# Patient Record
Sex: FEMALE | Race: WHITE | Employment: FULL TIME | URBAN - NONMETROPOLITAN AREA
[De-identification: names, ages, dates, MRNs, and addresses within clinical notes are randomized per-mention and may not be internally consistent; named-entity substitution may affect disease eponyms.]

---

## 2018-11-08 ENCOUNTER — HOSPITAL ENCOUNTER (OUTPATIENT)
Dept: WOMENS IMAGING | Age: 48
Discharge: HOME | End: 2018-11-10
Payer: COMMERCIAL

## 2018-11-08 DIAGNOSIS — Z00.00 WELLNESS EXAMINATION: ICD-10-CM

## 2018-11-08 PROCEDURE — 77063 BREAST TOMOSYNTHESIS BI: CPT

## 2019-11-26 ENCOUNTER — HOSPITAL ENCOUNTER (OUTPATIENT)
Dept: WOMENS IMAGING | Age: 49
Discharge: HOME | End: 2019-11-28
Payer: COMMERCIAL

## 2019-11-26 DIAGNOSIS — Z12.31 BREAST CANCER SCREENING BY MAMMOGRAM: ICD-10-CM

## 2019-11-26 PROCEDURE — 77063 BREAST TOMOSYNTHESIS BI: CPT

## 2020-04-16 ENCOUNTER — HOSPITAL ENCOUNTER (OUTPATIENT)
Dept: GENERAL RADIOLOGY | Age: 50
Discharge: HOME | End: 2020-04-18
Payer: COMMERCIAL

## 2020-04-16 ENCOUNTER — HOSPITAL ENCOUNTER (OUTPATIENT)
Age: 50
Discharge: HOME | End: 2020-04-18
Payer: COMMERCIAL

## 2020-04-16 PROCEDURE — 71046 X-RAY EXAM CHEST 2 VIEWS: CPT

## 2020-10-29 ENCOUNTER — OFFICE VISIT (OUTPATIENT)
Dept: OBGYN | Age: 50
End: 2020-10-29
Payer: COMMERCIAL

## 2020-10-29 ENCOUNTER — HOSPITAL ENCOUNTER (OUTPATIENT)
Age: 50
Discharge: HOME | End: 2020-10-29
Payer: COMMERCIAL

## 2020-10-29 ENCOUNTER — HOSPITAL ENCOUNTER (OUTPATIENT)
Age: 50
Setting detail: SPECIMEN
Discharge: HOME | End: 2020-10-29
Payer: COMMERCIAL

## 2020-10-29 VITALS
DIASTOLIC BLOOD PRESSURE: 62 MMHG | HEIGHT: 62 IN | WEIGHT: 156 LBS | BODY MASS INDEX: 28.71 KG/M2 | SYSTOLIC BLOOD PRESSURE: 122 MMHG

## 2020-10-29 LAB
ESTRADIOL LEVEL: 432 PG/ML (ref 27–314)
FOLLICLE STIMULATING HORMONE: 3.2 U/L (ref 1.7–21.5)
LH: 12.4 U/L (ref 1–95.6)
TESTOSTERONE TOTAL: 46 NG/DL (ref 20–70)
TSH SERPL DL<=0.05 MIU/L-ACNC: 2.44 MIU/L (ref 0.3–5)

## 2020-10-29 PROCEDURE — 99396 PREV VISIT EST AGE 40-64: CPT | Performed by: ADVANCED PRACTICE MIDWIFE

## 2020-10-29 PROCEDURE — 82627 DEHYDROEPIANDROSTERONE: CPT

## 2020-10-29 PROCEDURE — 83001 ASSAY OF GONADOTROPIN (FSH): CPT

## 2020-10-29 PROCEDURE — 87624 HPV HI-RISK TYP POOLED RSLT: CPT

## 2020-10-29 PROCEDURE — 83002 ASSAY OF GONADOTROPIN (LH): CPT

## 2020-10-29 PROCEDURE — 84443 ASSAY THYROID STIM HORMONE: CPT

## 2020-10-29 PROCEDURE — 82670 ASSAY OF TOTAL ESTRADIOL: CPT

## 2020-10-29 PROCEDURE — 36415 COLL VENOUS BLD VENIPUNCTURE: CPT

## 2020-10-29 PROCEDURE — 84403 ASSAY OF TOTAL TESTOSTERONE: CPT

## 2020-10-29 PROCEDURE — G0145 SCR C/V CYTO,THINLAYER,RESCR: HCPCS

## 2020-10-29 ASSESSMENT — PATIENT HEALTH QUESTIONNAIRE - PHQ9
1. LITTLE INTEREST OR PLEASURE IN DOING THINGS: 1
SUM OF ALL RESPONSES TO PHQ QUESTIONS 1-9: 2
SUM OF ALL RESPONSES TO PHQ9 QUESTIONS 1 & 2: 2
SUM OF ALL RESPONSES TO PHQ QUESTIONS 1-9: 2
2. FEELING DOWN, DEPRESSED OR HOPELESS: 1
SUM OF ALL RESPONSES TO PHQ QUESTIONS 1-9: 2
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
## 2020-10-30 LAB — DHEAS (DHEA SULFATE): 342 UG/DL (ref 26–200)

## 2020-11-01 ASSESSMENT — ENCOUNTER SYMPTOMS
SHORTNESS OF BREATH: 0
ABDOMINAL PAIN: 0

## 2020-11-03 LAB
HPV SOURCE: NORMAL
HPV, GENOTYPE 16: NOT DETECTED
HPV, GENOTYPE 18: NOT DETECTED
HPV, HIGH RISK OTHER: NOT DETECTED

## 2020-11-04 ENCOUNTER — OFFICE VISIT (OUTPATIENT)
Dept: OBGYN | Age: 50
End: 2020-11-04
Payer: COMMERCIAL

## 2020-11-04 VITALS
BODY MASS INDEX: 28.63 KG/M2 | WEIGHT: 155.6 LBS | SYSTOLIC BLOOD PRESSURE: 112 MMHG | DIASTOLIC BLOOD PRESSURE: 80 MMHG | HEIGHT: 62 IN

## 2020-11-04 PROCEDURE — 99213 OFFICE O/P EST LOW 20 MIN: CPT | Performed by: ADVANCED PRACTICE MIDWIFE

## 2020-11-04 RX ORDER — DROSPIRENONE AND ETHINYL ESTRADIOL 0.02-3(28)
1 KIT ORAL DAILY
Qty: 28 TABLET | Refills: 3 | Status: SHIPPED | OUTPATIENT
Start: 2020-11-04 | End: 2022-10-21

## 2020-11-04 NOTE — PROGRESS NOTES
PROBLEM VISIT     Date of service: 2020    Owen Montes  Is a 48 y.o.  female    PT's PCP is: Betsy Trevino MD     : 1970                                             Subjective:       Patient's last menstrual period was 10/02/2020 (exact date). OB History    Para Term  AB Living   3 3 3     3   SAB TAB Ectopic Molar Multiple Live Births             3      # Outcome Date GA Lbr William/2nd Weight Sex Delivery Anes PTL Lv   3 Term 98 40w0d   M Vag-Spont   VANNESSA   2 Term 93 40w0d   M Vag-Spont   VANNESSA   1 Term 10/18/90 40w0d   M Vag-Spont   VANNESSA        Social History     Tobacco Use   Smoking Status Never Smoker   Smokeless Tobacco Never Used        Social History     Substance and Sexual Activity   Alcohol Use Yes    Comment: occasional       Social History     Substance and Sexual Activity   Sexual Activity Yes    Partners: Male    Comment: partner vasectomy       Allergies: Patient has no known allergies. Chief Complaint   Patient presents with    Menstrual Problem     Follow up in house ultrasound and labs. Concerns with emotions. Last Yearly:  10/2020    Last pap: 10/2020    Last HPV: 10/2020    PE:  Vital Signs  Blood pressure 112/80, height 5' 2\" (1.575 m), weight 155 lb 9.6 oz (70.6 kg), last menstrual period 10/02/2020, not currently breastfeeding. Estimated body mass index is 28.46 kg/m² as calculated from the following:    Height as of this encounter: 5' 2\" (1.575 m). Weight as of this encounter: 155 lb 9.6 oz (70.6 kg). NURSE: Jameson POLANCO    HPI: here for irregular menses and mood        PT denies fever, chills, nausea and vomiting       Objective: No acute distress  Excellent communications  Well-nourished    Results reviewed today:  Sono:  UTERUS: non-homogenous, adenomyotic appearing, anteverted uterus     ENDO: measures 7.8 mm, LMP 10-     RT. OVARY: 2.4 x 2.1 x 2.4 cm simple cyst     LT.  OVARY: two simple cysts, 2.4 x 1.9 x 2.1 cm & 2.1 x 1.8 x 2 cm in size     No free fluid  No results found for this visit on 11/04/20. Assessment and Plan          Diagnosis Orders   1. Hirsutism  Eflornithine HCl 13.9 % CREA   2. Irregular menses     3. PMDD (premenstrual dysphoric disorder)  drospirenone-ethinyl estradiol (FORTINO) 3-0.02 MG per tablet         discussed premphase, vs low dose ocp vs expectant management. Patient desired low dose ocp, discussed, use, risks, dangers, side effects. I am having Gino Shutters. Gage start on drospirenone-ethinyl estradiol and Eflornithine HCl. I am also having her maintain her loratadine, albuterol sulfate HFA, montelukast, levothyroxine, ondansetron, escitalopram, and ALPRAZolam.    Return in about 3 months (around 2/4/2021) for med check. There are no Patient Instructions on file for this visit. Over 50% of time spent on counseling and care coordination on: see assessment and plan,  She was also counseled on her preventative health maintenance recommendations and follow-up.         FF time: 15 min      Azalea Monroy,11/5/2020 8:43 PM

## 2020-11-05 LAB — CYTOLOGY REPORT: NORMAL

## 2020-11-05 RX ORDER — METRONIDAZOLE 7.5 MG/G
GEL VAGINAL
Qty: 1 TUBE | Refills: 0 | Status: SHIPPED | OUTPATIENT
Start: 2020-11-05 | End: 2021-07-26

## 2020-11-11 ENCOUNTER — HOSPITAL ENCOUNTER (OUTPATIENT)
Dept: LAB | Age: 50
Setting detail: SPECIMEN
Discharge: HOME | End: 2020-11-11
Payer: COMMERCIAL

## 2020-11-11 PROCEDURE — C9803 HOPD COVID-19 SPEC COLLECT: HCPCS

## 2020-11-11 PROCEDURE — U0003 INFECTIOUS AGENT DETECTION BY NUCLEIC ACID (DNA OR RNA); SEVERE ACUTE RESPIRATORY SYNDROME CORONAVIRUS 2 (SARS-COV-2) (CORONAVIRUS DISEASE [COVID-19]), AMPLIFIED PROBE TECHNIQUE, MAKING USE OF HIGH THROUGHPUT TECHNOLOGIES AS DESCRIBED BY CMS-2020-01-R: HCPCS

## 2020-11-14 LAB — SARS-COV-2, NAA: DETECTED

## 2020-11-15 ENCOUNTER — TELEPHONE (OUTPATIENT)
Dept: PRIMARY CARE CLINIC | Age: 50
End: 2020-11-15

## 2021-01-15 ENCOUNTER — HOSPITAL ENCOUNTER (OUTPATIENT)
Dept: WOMENS IMAGING | Age: 51
Discharge: HOME | End: 2021-01-17
Payer: COMMERCIAL

## 2021-01-15 DIAGNOSIS — Z12.31 SCREENING MAMMOGRAM, ENCOUNTER FOR: ICD-10-CM

## 2021-01-15 PROCEDURE — 77063 BREAST TOMOSYNTHESIS BI: CPT

## 2021-02-09 ENCOUNTER — OFFICE VISIT (OUTPATIENT)
Dept: OBGYN | Age: 51
End: 2021-02-09
Payer: COMMERCIAL

## 2021-02-09 VITALS
WEIGHT: 161.2 LBS | HEIGHT: 62 IN | DIASTOLIC BLOOD PRESSURE: 66 MMHG | BODY MASS INDEX: 29.66 KG/M2 | SYSTOLIC BLOOD PRESSURE: 118 MMHG

## 2021-02-09 DIAGNOSIS — N92.6 IRREGULAR MENSES: ICD-10-CM

## 2021-02-09 DIAGNOSIS — F32.81 PMDD (PREMENSTRUAL DYSPHORIC DISORDER): Primary | ICD-10-CM

## 2021-02-09 PROCEDURE — 99213 OFFICE O/P EST LOW 20 MIN: CPT | Performed by: ADVANCED PRACTICE MIDWIFE

## 2021-02-09 RX ORDER — DROSPIRENONE AND ETHINYL ESTRADIOL 0.02-3(28)
1 KIT ORAL DAILY
Qty: 28 TABLET | Refills: 8 | Status: SHIPPED | OUTPATIENT
Start: 2021-02-09 | End: 2021-02-15 | Stop reason: SDUPTHER

## 2021-02-09 NOTE — PROGRESS NOTES
PROBLEM VISIT     Date of service: 2021    Rebekah King  Is a 48 y.o.  female    PT's PCP is: Maya Herron MD     : 1970                                             Subjective:       Patient's last menstrual period was 2021 (approximate). OB History    Para Term  AB Living   3 3 3     3   SAB TAB Ectopic Molar Multiple Live Births             3      # Outcome Date GA Lbr William/2nd Weight Sex Delivery Anes PTL Lv   3 Term 98 40w0d   M Vag-Spont   VANNESSA   2 Term 93 40w0d   M Vag-Spont   VANNESSA   1 Term 10/18/90 40w0d   M Vag-Spont   VANNESSA        Social History     Tobacco Use   Smoking Status Never Smoker   Smokeless Tobacco Never Used        Social History     Substance and Sexual Activity   Alcohol Use Yes    Comment: occasional       Social History     Substance and Sexual Activity   Sexual Activity Yes    Partners: Male    Comment: partner vasectomy       Allergies: Patient has no known allergies. Chief Complaint   Patient presents with    Mood Swings     Medication follow up. patient is taking Herman and \"feels better\". Periods are regular. Last Yearly:  10/2020    Last pap: 10/2020    Last HPV: 10/2020    PE:  Vital Signs  Blood pressure 118/66, height 5' 2\" (1.575 m), weight 161 lb 3.2 oz (73.1 kg), last menstrual period 2021, not currently breastfeeding. Estimated body mass index is 29.48 kg/m² as calculated from the following:    Height as of this encounter: 5' 2\" (1.575 m). Weight as of this encounter: 161 lb 3.2 oz (73.1 kg). No data recorded      NURSE: Jameson POLANCO    HPI: here for med check herman, has regulated cycles well and mood is \"much better\", has been unable to lose weight and is discussing thyroid with Dr. Brian Betancourt next week      PT denies fever, chills, nausea and vomiting       Objective: No acute distress  Excellent communications  Well-nourished    Results reviewed today:    No results found for this visit on 21. Assessment and Plan          Diagnosis Orders   1. PMDD (premenstrual dysphoric disorder)  drospirenone-ethinyl estradiol (FORTINO) 3-0.02 MG per tablet   2. Irregular menses  drospirenone-ethinyl estradiol (FORTINO) 3-0.02 MG per tablet       will continue fortino through 51-52 and then consider HRT dependent on symptoms at that time      I am having Lakeisha Almonte start on drospirenone-ethinyl estradiol. I am also having her maintain her loratadine, albuterol sulfate HFA, montelukast, levothyroxine, ondansetron, drospirenone-ethinyl estradiol, Eflornithine HCl, metroNIDAZOLE, and escitalopram.    Return in about 8 months (around 10/9/2021). There are no Patient Instructions on file for this visit. Over 50% of time spent on counseling and care coordination on: see assessment and plan,  She was also counseled on her preventative health maintenance recommendations and follow-up.         FF time: 20 min      Marv Monroy,2/9/2021 8:47 AM

## 2021-06-28 ENCOUNTER — HOSPITAL ENCOUNTER (OUTPATIENT)
Age: 51
Discharge: HOME | End: 2021-06-30
Payer: COMMERCIAL

## 2021-06-28 ENCOUNTER — HOSPITAL ENCOUNTER (OUTPATIENT)
Dept: GENERAL RADIOLOGY | Age: 51
Discharge: HOME | End: 2021-06-30
Payer: COMMERCIAL

## 2021-06-28 DIAGNOSIS — K21.9 GASTROESOPHAGEAL REFLUX DISEASE WITHOUT ESOPHAGITIS: ICD-10-CM

## 2021-06-28 PROCEDURE — 71045 X-RAY EXAM CHEST 1 VIEW: CPT

## 2021-07-26 ENCOUNTER — HOSPITAL ENCOUNTER (OUTPATIENT)
Age: 51
Discharge: HOME | End: 2021-07-26
Payer: COMMERCIAL

## 2021-07-26 ENCOUNTER — OFFICE VISIT (OUTPATIENT)
Dept: PULMONOLOGY | Age: 51
End: 2021-07-26
Payer: COMMERCIAL

## 2021-07-26 VITALS
RESPIRATION RATE: 18 BRPM | SYSTOLIC BLOOD PRESSURE: 112 MMHG | HEART RATE: 98 BPM | OXYGEN SATURATION: 97 % | BODY MASS INDEX: 30.16 KG/M2 | WEIGHT: 163.9 LBS | HEIGHT: 62 IN | TEMPERATURE: 99.2 F | DIASTOLIC BLOOD PRESSURE: 64 MMHG

## 2021-07-26 DIAGNOSIS — J45.40 MODERATE PERSISTENT ASTHMA WITHOUT COMPLICATION: ICD-10-CM

## 2021-07-26 DIAGNOSIS — J45.40 MODERATE PERSISTENT ASTHMA WITHOUT COMPLICATION: Primary | ICD-10-CM

## 2021-07-26 PROCEDURE — 82785 ASSAY OF IGE: CPT

## 2021-07-26 PROCEDURE — 86003 ALLG SPEC IGE CRUDE XTRC EA: CPT

## 2021-07-26 PROCEDURE — 36415 COLL VENOUS BLD VENIPUNCTURE: CPT

## 2021-07-26 PROCEDURE — 99204 OFFICE O/P NEW MOD 45 MIN: CPT | Performed by: INTERNAL MEDICINE

## 2021-07-26 RX ORDER — FEXOFENADINE HCL 180 MG/1
180 TABLET ORAL DAILY
COMMUNITY

## 2021-07-26 RX ORDER — MONTELUKAST SODIUM 10 MG/1
10 TABLET ORAL DAILY
Qty: 30 TABLET | Refills: 3 | Status: SHIPPED | OUTPATIENT
Start: 2021-07-26 | End: 2021-12-21 | Stop reason: SDUPTHER

## 2021-07-26 RX ORDER — FLUTICASONE PROPIONATE AND SALMETEROL 113; 14 UG/1; UG/1
1 POWDER, METERED RESPIRATORY (INHALATION) 2 TIMES DAILY
Qty: 60 EACH | Refills: 2 | Status: SHIPPED | OUTPATIENT
Start: 2021-07-26 | End: 2022-08-22

## 2021-07-26 NOTE — PATIENT INSTRUCTIONS
SURVEY:    You may be receiving a survey from [employer] regarding your visit today. Please complete the survey to enable us to provide the highest quality of care to you and your family. If you cannot score us a very good on any question, please call the office to discuss how we could have made your experience a very good one. Thank you.

## 2021-07-26 NOTE — PROGRESS NOTES
PULMONARY MEDICINE OUTPATIENT  CONSULT NOTE                                                                       PATIENT:  Lisa Ortiz  YOB: 1970  MRN: [id number]     REFERRED BY: Heydi Mendez MD   CHIEF COMPLIANT: Asthma and Other (had covid in November but feels like she continues with breathing issues-using her inhaler very frequently)       HISTORY     HISTORY OF PRESENT ILLNESS:   Lisa Ortiz is a 48y.o. year old female here for establishing care    Moderate persistent asthma:  Patient reports history of asthma since childhood. Also has seasonal allergies worsened fall. She was previously on Singulair, recently switched to Guerraview by primary care provider. She reports that her asthma was fairly well controlled and she only using as needed albuterol. She has developed worsening of her symptoms since she got a pneumonia and a COVID-19 infection late last year. She reports cough, shortness of breath and occasional wheezing. She is using albuterol 2-3 times every day and wakes up at night due to asthma symptoms. She is a non-smoker, denies any significant environmental exposure. She has dogs at home, denies any allergy to pets. Works at [employer] during the third shift. Goes to bed around 8 AM, sleeps through noon and again in the evening from 5-8. Denies any excessive daytime sleepiness. PAST MEDICAL HISTORY:      Diagnosis Date    Allergic rhinitis     Asthma     REMOTE    Thyroid disease      PAST SURGICAL HISTORY:  History reviewed. No pertinent surgical history. SOCIAL HISTORY:  TOBACCO:   reports that she has never smoked. She has never used smokeless tobacco.  ETOH:   reports current alcohol use. DRUGS: reports no history of drug use.      AVOCATION/OCCUPATIONAL EXPOSURE:  [] Asbestos      [] Hot tub  [] Silica dust    [] Pets  [] Coal            [] Exotic birds  [] [employer]       [] Tuberculosis  [] Benuel Piper         [] Others  [] [employer] PHYSICAL EXAMINATION      VITAL SIGNS:   /64 (Site: Left Upper Arm, Position: Sitting, Cuff Size: Medium Adult)   Pulse 98   Temp 99.2 °F (37.3 °C) (Temporal)   Resp 18   Ht 5' 2\" (1.575 m)   Wt 163 lb 14.4 oz (74.3 kg)   SpO2 97%   BMI 29.98 kg/m²   Wt Readings from Last 3 Encounters:   07/26/21 163 lb 14.4 oz (74.3 kg)   07/14/21 166 lb (75.3 kg)   06/28/21 166 lb 8 oz (75.5 kg)     SYSTEMIC EXAMINATION:   General appearance -  [x] well appearing  [x] overweight  [] obese [] cachectic [x] comfortable  [x] in no acute distress  [] chronically ill-appearing  [] in mild to moderate respiratory distress   Mental status -  [x] alert  [x] oriented to person, place, and time  [] anxious  [] depressed mood    Head-  [x] atraumatic  [x] normocephalic   Eyes -  [] pupils equal and reactive, extraocular eye movements intact  [] sclera anicteric   Ears -  [x] hearing grossly normal bilaterally [] bilateral TM's and external ear canals normal   Nose -  [x] normal and patent [] no erythema  [] discharge   Mouth -  [x] mucous membranes moist  [] pharynx normal without lesions [] erythematous  [] exudate noted   Mallampati Airway Score -  [] I (soft palate, uvula, fauces, tonsillar pillars visible) [] II (soft palate, uvula, fauces visible) []  III (soft palate, base of uvula visible) [] IV (only hard palate visible)   Neck -  [] supple  [] no significant adenopathy  [] carotids upstroke normal bilaterally [] no JVD  [] no bruit   Lymphatics -  [x] no palpable lymphadenopathy  [] no hepatosplenomegaly   Chest -   [x] normal chest excursion  [x] no chest wall tenderness  [] increased AP diameter[] pectus noted [] scoliosis noted [x] no tachypnea retraction or cyanosis [x] clear to auscultation, no wheezes, rales or rhonchi, symmetric air entry  [] wheezing noted  [] rales noted  []rhonchi noted [] decreased air entry noted bilaterally   Heart -  [x] normal rate,  [x] regular rhythm  [] irregularly irregular rhythm [x] normal S1  [x] normal S2  [x] no murmurs, rubs, clicks or gallops  [] S3 present  [] S4 present  [] systolic murmur  [] diastolic murmur  [] midsystolic click []pericardial rub present    Abdomen -  [] soft [] nontender  [] nondistended  [] no masses or organomegaly   Neurological -  [x] normal speech  [x] no focal findings or movement disorder noted  [] cranial nerves II through XII intact  [] DTR's normal and symmetric  [] Babinski sign negative,  [x] motor and sensory grossly normal bilaterally  [] normal muscle tone [x] no tremors  [] strength 5/5  [] Romberg sign negative [] normal gait    Musculoskeletal -  [x] no joint tenderness [x] no deformity or swelling   Extremities - [x] no clubbing or cyanosis,  [x] no pedal edema [] pedal edema  [] intact peripheral pulses   Skin -  [x] normal coloration and turgor  [x] no rashes  [] no suspicious skin lesions noted          MEDICAL DECISION MAKING     1. PROBLEMS ADDRESSED (NUMBER AND COMPLEXITY)       ICD-10-CM    1. Moderate persistent asthma without complication  F95.80 Full PFT Study With Bronchodilator     Fluticasone-Salmeterol 113-14 MCG/ACT AEPB     Allergen, Region 5 Respiratory Panel     montelukast (SINGULAIR) 10 MG tablet        2.  DATA REVIEWED AND ANALYZED (AMOUNT AND/OR COMPLEXITY)   LABS:  ABG:   No results found for: PH, PHART, PCO2, NUG6CEN, PO2, PO2ART, HCO3, JDG7BAB, BE, BEART, THGB, D5ETZQZK  VBG:  No results found for: PHVEN, QOI0VCU, BEVEN, M1RRMEXS  CBC:   Lab Results   Component Value Date    WBC 7.5 09/14/2020    RBC 4.37 09/14/2020    HGB 13.5 09/14/2020    HCT 39.7 09/14/2020     09/14/2020    MCV 91 09/14/2020    MCH 30.9 09/14/2020    MCHC 34.0 09/14/2020    RDW 13.3 09/14/2020    LYMPHOPCT 20.2 09/14/2020    MONOPCT 7.6 09/14/2020    EOSPCT 5.6 09/14/2020    BASOPCT 0.7 09/14/2020    MONOSABS 0.6 09/14/2020    LYMPHSABS 1.5 09/14/2020    EOSABS 0.4 09/14/2020    BASOSABS 0.1 09/14/2020     Allergen Panel:No results found for: REGVALL  Eosinophils/IgE:   Lab Results   Component Value Date    EOSPCT 5.6 09/14/2020    EOSABS 0.4 09/14/2020     Alpha 1 antitrypsin: No results found for: A1A  CMP:   Lab Results   Component Value Date     09/14/2020    K 3.7 09/14/2020     09/14/2020    CO2 27 09/14/2020    BUN 21 09/14/2020    CREATININE 0.87 09/14/2020    GLUCOSE 64 (L) 09/14/2020    CALCIUM 9.2 09/14/2020    PROT 6.8 09/14/2020    LABALBU 4.1 09/14/2020    BILITOT 0.2 (L) 09/14/2020    ALT 22 09/14/2020    AST 18 09/14/2020    ALKPHOS 35 (L) 09/14/2020     Coagulation Profile:   No results found for: INR, PROTIME, APTT  BNP:   No results found for: BNP, PROBNP  D-Dimer/Fibrinogen:  No results found for: DDIMER  Others labs:  Lab Results   Component Value Date    TSH 2.44 10/29/2020     No results found for: TASHIA, ANATITER, RHEUMFACTOR, RF, C3, C4, MPO, PR3, SEDRATE, CRP  No results found for: IRON, TIBC, FERRITIN, FOLATE, LDH  No results found for: SPEP, UPEP, PSA, CEA, , MU0810,   No results found for: RPR, HIV    RADIOLOGY:  [] Ordered  [] Unavailable  [x] Reviewed  XR CHEST (SINGLE VIEW FRONTAL)    Result Date: 6/28/2021  EXAMINATION: ONE XRAY VIEW OF THE CHEST 6/28/2021 9:49 am COMPARISON: April 16, 2020 HISTORY: ORDERING SYSTEM PROVIDED HISTORY: Gastroesophageal reflux disease without esophagitis; Shortness of breath FINDINGS: Cardiomediastinal silhouette within normal limits. Lungs and costophrenic sulci are clear. No pneumothorax or subdiaphragmatic free air. No acute osseous abnormality identified. No radiographic evidence of acute cardiopulmonary disease. Chest x-ray:  CT chest:  PET scan:    ECHOCARDIOGRAM:  No results found for this or any previous visit.       PULMONARY FUNCTION TEST:  [] Ordered  [x] Unavailable  [] Reviewed  No results found for: FEV1, FVC, ATI5EJC, TLC, DLCO       6 MINUTE WALK TEST:  [] Ordered  [x] Unavailable  [] Reviewed  No results found for this or any previous visit. Distance Walk Predicted Distance LLN** Predicted % Duration (min) Duration of Stops Sec Tabatha Dyspnea Tabatha Fatigue               PRIOR EXTERNAL NOTES:  [] Unavailable  [x] Reviewed    ORDERS PLACED:  Orders Placed This Encounter   Procedures    Allergen, Region 5 Respiratory Panel     Standing Status:   Future     Standing Expiration Date:   7/26/2022    Full PFT Study With Bronchodilator     If an ABG is needed along with this PFT procedure, please place the appropriate lab order     Standing Status:   Future     Standing Expiration Date:   7/26/2022        3.  RISK OF COMPLICATIONS AND/OR MORBIDITY OR MORTALITY:     ALLERGIES:  No Known Allergies   MEDICATIONS:  Outpatient Medications Prior to Visit   Medication Sig Dispense Refill    fexofenadine (ALLEGRA ALLERGY) 180 MG tablet Take 180 mg by mouth daily      famotidine (PEPCID) 20 MG tablet Take 1 tablet by mouth 2 times daily 60 tablet 3    albuterol sulfate HFA (VENTOLIN HFA) 108 (90 Base) MCG/ACT inhaler Inhale 2 puffs into the lungs 4 times daily as needed for Wheezing 1 Inhaler 2    escitalopram (LEXAPRO) 10 MG tablet Take 1 tablet by mouth daily (Patient taking differently: Take 5 mg by mouth daily ) 30 tablet 3    drospirenone-ethinyl estradiol (FORTINO) 3-0.02 MG per tablet Take 1 tablet by mouth daily 28 tablet 3    levothyroxine (SYNTHROID) 50 MCG tablet Take 1 tablet by mouth Daily 90 tablet 3    predniSONE (DELTASONE) 10 MG tablet 4 tabs daily for 3 days, 3 tabs daily for 3 days, 2 tabs daily for 3 days, 1 tabs daily for 3 days 30 tablet 0    metroNIDAZOLE (METROGEL) 0.75 % vaginal gel Place one applicatorful vaginally once a night for 5 nights (Patient not taking: Reported on 6/28/2021) 1 Tube 0    Eflornithine HCl 13.9 % CREA Apply 1 applicator topically 2 times daily To chin (Patient not taking: Reported on 6/28/2021) 1 Tube 3    montelukast (SINGULAIR) 10 MG tablet Take 1 tablet by mouth daily (Patient not taking: Reported on 6/28/2021) 90 tablet 1    loratadine (CLARITIN) 10 MG tablet Take 10 mg by mouth daily       No facility-administered medications prior to visit. PRESCRIPTION DRUG MANAGEMENT/RECOMMENDATIONS:   Ordered PFT, allergy panel   We will start her on LABA/ICS combination   Recommended to continue using Singulair and as needed albuterol   Reviewed use of rescue vs controlling agents and potential side effects.  Reviewed use, techniques, schedule and side effects of all inhaled medications.  Refills were provided as requested.  Barriers to medication compliance addressed.  Patient to have prednisone and an antibiotic available for use during an exacerbation. LIFESTYLE MODIFICATION RECOMMENDATIONS/COUNSELING:   Follow healthy behaviors: nutrition, exercise and medication adherence.  Maintain an active lifestyle. LUNG CANCER SCREENING/OTHER IMAGING RECOMMENDATIONS:   After reviewing the patient's smoking history, age and other clinical criteria, the low dose CT is not indicated (Nonsmoker/Smoking <30 pack-years)     IMMUNIZATION HISTORY/RECOMMENDATIONS:    There is no immunization history on file for this patient. All the questions that the patient had were answered to her satisfaction  We'll see the patient back in one month  Thank you for having us involved in the care of your patient. Please call us if you have any questions or concerns. Scott Sexton MD  Pulmonary and Critical Care Medicine            Please note that this chart was generated using voice recognition Dragon dictation software. Although every effort was made to ensure the accuracy of this automated transcription, some errors in transcription may have occurred.

## 2021-07-27 LAB
2000687N OAK TREE IGE: 0.51 KU/L (ref 0–0.34)
ALLERGEN BERMUDA GRASS IGE: 0.27 KU/L (ref 0–0.34)
ALLERGEN BIRCH IGE: 0.21 KU/L (ref 0–0.34)
ALLERGEN DOG DANDER IGE: <0.1 KU/L (ref 0–0.34)
ALLERGEN GERMAN COCKROACH IGE: <0.1 KU/L (ref 0–0.34)
ALLERGEN HORMODENDRUM IGE: 0.58 KUL/L (ref 0–0.34)
ALLERGEN MOUSE EPITHELIA IGE: <0.1 KU/L (ref 0–0.34)
ALLERGEN PECAN TREE IGE: 0.28 KU/L (ref 0–0.34)
ALLERGEN PIGWEED ROUGH IGE: 0.55 KU/L (ref 0–0.34)
ALLERGEN SHEEP SORREL (W18) IGE: 0.27 KU/L (ref 0–0.34)
ALLERGEN TREE SYCAMORE: 0.51 KU/L (ref 0–0.34)
ALLERGEN WALNUT TREE IGE: 0.59 KU/L (ref 0–0.34)
ALLERGEN WHITE MULBERRY TREE, IGE: 0.18 KU/L (ref 0–0.34)
ALLERGEN, TREE, WHITE ASH IGE: 0.7 KU/L (ref 0–0.34)
ALTERNARIA ALTERNATA: 7.7 KU/L (ref 0–0.34)
ASPERGILLUS FUMIGATUS: 0.69 KU/L (ref 0–0.34)
CAT DANDER ANTIBODY: <0.1 KU/L (ref 0–0.34)
COTTONWOOD TREE: 0.4 KU/L (ref 0–0.34)
D. FARINAE: 3.38 KU/L (ref 0–0.34)
D. PTERONYSSINUS: 3.54 KU/L (ref 0–0.34)
ELM TREE: 0.42 KU/L (ref 0–0.34)
IGE: 515 IU/ML
MAPLE/BOXELDER TREE: 0.45 KU/L (ref 0–0.34)
MOUNTAIN CEDAR TREE: 0.51 KU/L (ref 0–0.34)
MUCOR RACEMOSUS: <0.1 KU/L (ref 0–0.34)
P. NOTATUM: 0.4 KU/L (ref 0–0.34)
RUSSIAN THISTLE: 0.34 KU/L (ref 0–0.34)
SHORT RAGWD(A ARTEMIS.) IGE: 1.72 KU/L (ref 0–0.34)
TIMOTHY GRASS: 0.4 KU/L (ref 0–0.34)

## 2021-07-29 NOTE — PROGRESS NOTES
Subjective:      Patient ID: Debbie Lo is a 48 y.o. female. HPI  Here for follow-up for asthma and history of Covid 19. Patient was last seen in the office in July 2021 per Dr. Delvin Lam. Patient reports that since starting her inhaler therapy her breathing has gotten much better. She went from using her albuterol rescue inhaler 3-4 times a day to only needing to utilize 3-4 times a week. Patient was initially diagnosed with Covid in November. She has not yet received her Covid vaccine. She describes concerns about Covid vaccine side effects. Encourage patient to consider vaccination and educated. Patient is on hormonal contraceptive and is aged 48, would advise patient to receive Pfizer or [employer]. Medications:   Allegra:  Fluticasonesalmeterol 113-114 mcg:  Singulair 10 mg:  Pepcid 20 mg twice daily:  Albuterol HFA: 3-4 x a week    PRIOR WORKUP:  PFT:  PFT 8/6/2021: FVC 2.97 (90% of predicted), FEV1 2.37 (91% of predicted), FEV1/FVC ratio is normal.  Postbronchodilator no significant response. Residual volume 2.31 (136% of predicted), TLC 5.26 (110% of predicted). Diffusion capacity 21.37 (99% of predicted). Final impression: Normal spirometry with no clinical bronchodilator response. Lung volume with slight elevation suggestive of mild airway trapping. Normal diffusion capacity. CT Imaging:  Chest x-ray 6/28/2021: Negative for acute cardiopulmonary disease. No pneumothorax. No CT imaging on chart    Sleep Study:    Laboratory Evaluation:  Respiratory allergen panel 7/26/2021:  -IgE 515elevated  -Alternaria Alternata 7.70elevated  -Maple/Box Elder tree 0.5elevated  -Mountain cedar tree 0.51elevated  -Williams tree 0.40elevated  -Pigweed IgE 0.55elevated  -Daryl grass 0.40elevated  -Hormodendrum IgE 0.58elevated  -Elm tree 0.42elevated  -Oak tree IgE 0.51elevated  -Aspergillus fumigatus 0.69elevated  D. Pteronyssinus 3.54elevated  -GERALDO Robbins 3.38elevated  -White benigno tree IgE 0.70elevated  -P. Notatum-0.40elevated  -Short ragweed 1.72elevated  -Sycamore tree 0.51elevated  - Maxwell tree 0.59elevated                    Immunizations: There is no immunization history on file for this patient. No flowsheet data found. /64   Pulse 73   Temp 97.2 °F (36.2 °C)   Resp 16   Ht 5' 2\" (1.575 m)   Wt 172 lb 8 oz (78.2 kg)   SpO2 95%   BMI 31.55 kg/m²     Past Medical History:   Diagnosis Date    Allergic rhinitis     Asthma     REMOTE    Thyroid disease      No past surgical history on file. Family History   Problem Relation Age of Onset    Other Other         1/2 sistre breast cancer. No family h/o ovarian cancer or DVT. Social History     Socioeconomic History    Marital status:      Spouse name: Not on file    Number of children: Not on file    Years of education: Not on file    Highest education level: Not on file   Occupational History     Employer: [employer]   Tobacco Use    Smoking status: Never Smoker    Smokeless tobacco: Never Used   Vaping Use    Vaping Use: Never used   Substance and Sexual Activity    Alcohol use: Yes     Comment: occasional    Drug use: No    Sexual activity: Yes     Partners: Male     Comment: partner vasectomy   Other Topics Concern    Not on file   Social History Narrative    Not on file     Social Determinants of Health     Financial Resource Strain: Low Risk     Difficulty of Paying Living Expenses: Not hard at all   Food Insecurity: No Food Insecurity    Worried About [address] in the Last Year: Never true    [address]  in the Last Year: Never true   Transportation Needs: No Transportation Needs    Lack of Transportation (Medical): No    Lack of Transportation (Non-Medical):  No   Physical Activity:     Days of Exercise per Week:     Minutes of Exercise per Session:    Stress:     Feeling of Stress :    Social Connections:     Frequency of Communication with Friends and Family:     Frequency of Social Gatherings with Friends and Family:     Attends Mandaeism Services:     Active Member of Clubs or Organizations:     Attends Club or Organization Meetings:     Marital Status:    Intimate Partner Violence:     Fear of Current or Ex-Partner:     Emotionally Abused:     Physically Abused:     Sexually Abused:        Review of Systems   Constitutional: Negative for fatigue and fever. HENT: Negative for postnasal drip, rhinorrhea, sinus pressure and sinus pain. Respiratory: Negative for cough, chest tightness, shortness of breath, wheezing and stridor. Cardiovascular: Negative for chest pain and leg swelling. Gastrointestinal: Negative for constipation, diarrhea, nausea and vomiting. Genitourinary: Negative for dysuria, frequency and urgency. Musculoskeletal: Negative for arthralgias, joint swelling and myalgias. Skin: Negative for rash. Neurological: Negative for dizziness, speech difficulty and headaches. Hematological: Does not bruise/bleed easily. Psychiatric/Behavioral: Negative for agitation, hallucinations, sleep disturbance and suicidal ideas.        Objective:        Physical Exam  General appearance - alert, well appearing, and in no distress and oriented to person, place, and time  Mental status - alert, oriented to person, place, and time, normal mood, behavior, speech, dress, motor activity, and thought processes  Eyes - pupils equal and reactive, extraocular eye movements intact  Ears - not examined  Nose - normal and patent, no erythema, discharge or polyps  Mouth - mucous membranes moist, pharynx normal without lesions  Neck - supple, no significant adenopathy  Chest - clear to auscultation, no wheezes, rales or rhonchi, symmetric air entry  Heart -normal rate, regular rhythm, normal S1, S2, no murmurs, rubs, clicks or gallops  Abdomen - soft, nontender, nondistended, no masses or organomegaly  Neuro- alert, oriented, normal speech, no focal findings or movement disorder noted}  Extremities - peripheral pulses normal, no pedal edema, no clubbing or cyanosis  Skin - normal coloration and turgor, no rashes, no suspicious skin lesions noted     Wt Readings from Last 3 Encounters:   08/23/21 172 lb 8 oz (78.2 kg)   07/26/21 163 lb 14.4 oz (74.3 kg)   07/14/21 166 lb (75.3 kg)       Results for orders placed or performed during the hospital encounter of 07/30/21   COVID-19    Specimen: Nasopharyngeal Swab   Result Value Ref Range    SARS-CoV-2          Source . NASOPHARYNGEAL SWAB     SARS-CoV-2 Not Detected Not Detected       No results found. Assessment:      1. Moderate persistent asthma without complication    2. History of COVID-19    3. COVID-19 vaccination refused          Plan:      1. Medications reviewed, continue as ordered. 2. Educated and clarified the medication use. 3. Recommend flu vaccination in the fall annually. Due in fall  4. Recommend Covid vaccine and pneumococcal vaccine. Patient not persuaded  5. Maintain an active lifestyle. 6. Patient's questions were answered to her satisfaction. 7. Pulmonary function tests were reviewed   8. Chest x-ray was reviewed  9.  We'll see the patient back in 12 months        Electronically signed by MARLENI Dawson CNP on 8/23/2021 at 10:47 AM

## 2021-07-30 ENCOUNTER — HOSPITAL ENCOUNTER (OUTPATIENT)
Dept: LAB | Age: 51
Setting detail: SPECIMEN
Discharge: HOME | End: 2021-07-30
Payer: COMMERCIAL

## 2021-07-30 DIAGNOSIS — Z01.818 PREOP TESTING: ICD-10-CM

## 2021-07-30 DIAGNOSIS — Z01.818 PREOP TESTING: Primary | ICD-10-CM

## 2021-07-30 PROCEDURE — U0003 INFECTIOUS AGENT DETECTION BY NUCLEIC ACID (DNA OR RNA); SEVERE ACUTE RESPIRATORY SYNDROME CORONAVIRUS 2 (SARS-COV-2) (CORONAVIRUS DISEASE [COVID-19]), AMPLIFIED PROBE TECHNIQUE, MAKING USE OF HIGH THROUGHPUT TECHNOLOGIES AS DESCRIBED BY CMS-2020-01-R: HCPCS

## 2021-07-30 PROCEDURE — C9803 HOPD COVID-19 SPEC COLLECT: HCPCS

## 2021-07-30 PROCEDURE — U0005 INFEC AGEN DETEC AMPLI PROBE: HCPCS

## 2021-07-31 LAB
SARS-COV-2: NORMAL
SARS-COV-2: NOT DETECTED
SOURCE: NORMAL

## 2021-08-06 ENCOUNTER — HOSPITAL ENCOUNTER (OUTPATIENT)
Dept: PULMONOLOGY | Age: 51
Discharge: HOME | End: 2021-08-06
Payer: COMMERCIAL

## 2021-08-06 DIAGNOSIS — J45.40 MODERATE PERSISTENT ASTHMA WITHOUT COMPLICATION: ICD-10-CM

## 2021-08-06 PROCEDURE — 94664 DEMO&/EVAL PT USE INHALER: CPT

## 2021-08-06 PROCEDURE — 94726 PLETHYSMOGRAPHY LUNG VOLUMES: CPT

## 2021-08-06 PROCEDURE — 94729 DIFFUSING CAPACITY: CPT

## 2021-08-06 PROCEDURE — 94060 EVALUATION OF WHEEZING: CPT

## 2021-08-06 PROCEDURE — 6370000000 HC RX 637 (ALT 250 FOR IP): Performed by: INTERNAL MEDICINE

## 2021-08-06 RX ORDER — ALBUTEROL SULFATE 90 UG/1
4 AEROSOL, METERED RESPIRATORY (INHALATION) ONCE
Status: COMPLETED | OUTPATIENT
Start: 2021-08-06 | End: 2021-08-06

## 2021-08-06 RX ADMIN — ALBUTEROL SULFATE 4 PUFF: 90 AEROSOL, METERED RESPIRATORY (INHALATION) at 09:11

## 2021-08-08 NOTE — PROCEDURES
[address], [place] [address]                               PULMONARY FUNCTION    PATIENT NAME: Angela Pena                     :        1970  MED REC NO:   [id number]                              ROOM:  ACCOUNT NO:   [de-identified]                           ADMIT DATE: 2021  PROVIDER:     Mindy Duke    DATE OF PROCEDURE:  2021    INTERPRETATION:  This spirometry shows FVC is 2.97, 90% predicted. FEV1  is 2.37, 91% predicted. FEV1/FVC ratio is normal.  Flow-volume loop is  normal.  No evidence of obstruction. Postbronchodilator, there was no  significant response to bronchodilator, but clinical response is  possible. Lung volume shows residual volume is 2.31, 136% predicted. Total lung capacity is 5.26, 110% predicted, which is suggestive of mild  airway trapping. Diffusion capacity is 21.37, 99% predicted, which is  within normal limits. IMPRESSION:  This pulmonary function test shows normal spirometry  without clinical response to bronchodilator. Lung volume is suggestive  of mild airway trapping. Diffusion capacity is normal.  Clinical  correlation is recommended. Kassi Manriquez    D: 2021 17:33:03       T: 2021 2:50:17     [email or web address]  Job#: [id number]     Doc#: [id number]    CC:  Massimo Cox.  MD Randy Rockwell [place]

## 2021-08-23 ENCOUNTER — OFFICE VISIT (OUTPATIENT)
Dept: PULMONOLOGY | Age: 51
End: 2021-08-23
Payer: COMMERCIAL

## 2021-08-23 VITALS
RESPIRATION RATE: 16 BRPM | DIASTOLIC BLOOD PRESSURE: 64 MMHG | TEMPERATURE: 97.2 F | SYSTOLIC BLOOD PRESSURE: 110 MMHG | OXYGEN SATURATION: 95 % | HEIGHT: 62 IN | WEIGHT: 172.5 LBS | HEART RATE: 73 BPM | BODY MASS INDEX: 31.74 KG/M2

## 2021-08-23 DIAGNOSIS — J45.40 MODERATE PERSISTENT ASTHMA WITHOUT COMPLICATION: Primary | ICD-10-CM

## 2021-08-23 DIAGNOSIS — Z86.16 HISTORY OF COVID-19: ICD-10-CM

## 2021-08-23 DIAGNOSIS — Z28.21 COVID-19 VACCINATION REFUSED: ICD-10-CM

## 2021-08-23 PROCEDURE — 99213 OFFICE O/P EST LOW 20 MIN: CPT | Performed by: NURSE PRACTITIONER

## 2021-08-23 ASSESSMENT — ENCOUNTER SYMPTOMS
STRIDOR: 0
SINUS PRESSURE: 0
DIARRHEA: 0
COUGH: 0
SHORTNESS OF BREATH: 0
NAUSEA: 0
SINUS PAIN: 0
WHEEZING: 0
RHINORRHEA: 0
VOMITING: 0
CHEST TIGHTNESS: 0
CONSTIPATION: 0

## 2021-08-23 NOTE — PATIENT INSTRUCTIONS
SURVEY:    You may be receiving a survey from [employer] regarding your visit today. Please complete the survey to enable us to provide the highest quality of care to you and your family. If you cannot score us a very good on any question, please call the office to discuss how we could have made your experience a very good one. Thank you.

## 2021-11-03 ENCOUNTER — OFFICE VISIT (OUTPATIENT)
Dept: OBGYN | Age: 51
End: 2021-11-03
Payer: COMMERCIAL

## 2021-11-03 VITALS
DIASTOLIC BLOOD PRESSURE: 74 MMHG | HEIGHT: 62 IN | BODY MASS INDEX: 31.76 KG/M2 | WEIGHT: 172.6 LBS | SYSTOLIC BLOOD PRESSURE: 118 MMHG

## 2021-11-03 DIAGNOSIS — L68.0 HIRSUTISM: ICD-10-CM

## 2021-11-03 DIAGNOSIS — F32.81 PMDD (PREMENSTRUAL DYSPHORIC DISORDER): ICD-10-CM

## 2021-11-03 DIAGNOSIS — Z01.419 ENCOUNTER FOR ANNUAL ROUTINE GYNECOLOGICAL EXAMINATION: Primary | ICD-10-CM

## 2021-11-03 DIAGNOSIS — Z12.31 SCREENING MAMMOGRAM, ENCOUNTER FOR: ICD-10-CM

## 2021-11-03 DIAGNOSIS — N92.6 IRREGULAR MENSES: ICD-10-CM

## 2021-11-03 PROCEDURE — 99396 PREV VISIT EST AGE 40-64: CPT | Performed by: ADVANCED PRACTICE MIDWIFE

## 2021-11-03 RX ORDER — DROSPIRENONE AND ETHINYL ESTRADIOL 0.02-3(28)
1 KIT ORAL DAILY
Qty: 28 TABLET | Refills: 12 | Status: SHIPPED | OUTPATIENT
Start: 2021-11-03 | End: 2022-10-21

## 2021-11-03 ASSESSMENT — ENCOUNTER SYMPTOMS
SHORTNESS OF BREATH: 1
ABDOMINAL PAIN: 0
BACK PAIN: 0

## 2021-11-03 NOTE — PROGRESS NOTES
YEARLY PHYSICAL    Date of service: 11/3/2021    To Soares  Is a 46 y.o.   female    PT's PCP is: Brett Patten MD     : 1970                                             Subjective:       Patient's last menstrual period was 10/27/2021 (approximate). Are your menses regular: yes    OB History    Para Term  AB Living   3 3 3     3   SAB TAB Ectopic Molar Multiple Live Births             3      # Outcome Date GA Lbr William/2nd Weight Sex Delivery Anes PTL Lv   3 Term 98 40w0d   M Vag-Spont   VANNESSA   2 Term 93 40w0d   M Vag-Spont   VANNESSA   1 Term 10/18/90 40w0d   M Vag-Spont   VNANESSA        Social History     Tobacco Use   Smoking Status Never Smoker   Smokeless Tobacco Never Used        Social History     Substance and Sexual Activity   Alcohol Use Yes    Comment: occasional       Family History   Problem Relation Age of Onset    Other Mother     Diabetes Father     Other Other         1/2 sistre breast cancer. No family h/o ovarian cancer or DVT. Any family history of breast or ovarian cancer: Yes, half sister breast    Any family history of blood clots: No    Have you had a positive covid test: Yes    Have you had the covid immunization: No      Allergies: Patient has no known allergies.       Current Outpatient Medications:     drospirenone-ethinyl estradiol (FORTINO) 3-0.02 MG per tablet, Take 1 tablet by mouth daily, Disp: 28 tablet, Rfl: 12    levothyroxine (EUTHYROX) 50 MCG tablet, Take 1 tablet by mouth Daily, Disp: 90 tablet, Rfl: 0    escitalopram (LEXAPRO) 10 MG tablet, Take 1 tablet by mouth daily, Disp: 90 tablet, Rfl: 3    pantoprazole (PROTONIX) 40 MG tablet, Take 1 tablet by mouth every morning (before breakfast), Disp: 90 tablet, Rfl: 0    fexofenadine (ALLEGRA ALLERGY) 180 MG tablet, Take 180 mg by mouth daily, Disp: , Rfl:     montelukast (SINGULAIR) 10 MG tablet, Take 1 tablet by mouth daily, Disp: 30 tablet, Rfl: 3    famotidine (PEPCID) 20 MG tablet, Take 1 tablet by mouth 2 times daily, Disp: 60 tablet, Rfl: 3    albuterol sulfate HFA (VENTOLIN HFA) 108 (90 Base) MCG/ACT inhaler, Inhale 2 puffs into the lungs 4 times daily as needed for Wheezing, Disp: 1 Inhaler, Rfl: 2    drospirenone-ethinyl estradiol (FORTINO) 3-0.02 MG per tablet, Take 1 tablet by mouth daily, Disp: 28 tablet, Rfl: 3    Fluticasone-Salmeterol 113-14 MCG/ACT AEPB, Inhale 1 puff into the lungs 2 times daily for 60 doses, Disp: 60 each, Rfl: 2    Social History     Substance and Sexual Activity   Sexual Activity Not Currently    Partners: Male    Comment: partner vasectomy       Any bleeding or pain with intercourse:  NA    Last Yearly:  2020    Last pap: 2020    Last HPV: 2020    Last Mammogram: 01/15/2021    Last Dexascan never    Last colorectal screen- type:never*  date  never    Do you do self breast exams: No    Past Medical History:   Diagnosis Date    Allergic rhinitis     Asthma     REMOTE    Thyroid disease        History reviewed. No pertinent surgical history. Family History   Problem Relation Age of Onset    Other Mother     Diabetes Father     Other Other         1/2 sistre breast cancer. No family h/o ovarian cancer or DVT. Chief Complaint   Patient presents with    Gynecologic Exam     Yearly exam. last pap 10/29/2020 negative, HPV not detected. concerned about weight gain. PE:  Vital Signs  Blood pressure 118/74, height 5' 2\" (1.575 m), weight 172 lb 9.6 oz (78.3 kg), last menstrual period 10/27/2021, not currently breastfeeding. Estimated body mass index is 31.57 kg/m² as calculated from the following:    Height as of this encounter: 5' 2\" (1.575 m). Weight as of this encounter: 172 lb 9.6 oz (78.3 kg). Labs:    No results found for this visit on 11/03/21.     No data recorded    NURSE: Tabitha POLANCO    HPI: here for annual gyn exam, c/o weight gain 11 lbs last 6 months, is doing well on herman, \"really helps\" moods, cycles. Was treated for PMDD, hirsutism. Thyroid managed by pcp \"due for labs\" is concerned had asthma as a child but outgrew it and when had covid 11/20 \"came back\" and now is dependent on steroid inhaler \"just cant do as much\" just started going back to the gym    Review of Systems   Constitutional: Positive for unexpected weight change. HENT: Negative for congestion. Respiratory: Positive for shortness of breath. Asthma \"know when I miss a dose\" (of steroid inhaler)   Cardiovascular: Negative for chest pain. Gastrointestinal: Negative for abdominal pain. Genitourinary: Negative for dysuria. Musculoskeletal: Negative for back pain. Skin: Negative for rash. Neurological: Negative for headaches. Psychiatric/Behavioral: The patient is not nervous/anxious. Objective  Lymphatic:   no lymphadenopathy  Heent:   negative   Cor: regular rate and rhythm, no murmurs              Pul:clear to auscultation bilaterally- no wheezes, rales or rhonchi, normal air movement, no respiratory distress      GI: Abdomen soft, non-tender. BS normal. No masses,  No organomegaly           Extremities: normal strength, tone, and muscle mass   Breasts: Breast:normal appearance, no masses or tenderness   Pelvic Exam: GENITAL/URINARY:  External Genitalia:  General appearance; normal, Hair distribution; normal, Lesions absent  Urethral Meatus:  Size normal, Location normal, Lesions absent, Prolapse absent  Urethra: Fullness absent, Masses absent  Bladder:  Fullness absent, Masses absent, Tenderness absent, Cystocele absent  Vagina:  General appearance normal, Estrogen effect normal, Discharge absent, Lesions absent, Pelvic support normal  Cervix:  General appearance normal, Lesions absent, Discharge absent, Tenderness absent, Enlargement absent, Nodularity absent  Uterus:  Size normal, Tenderness absent  Adenexa:   Masses absent, Tenderness absent  Anus/Perineum: Lesions absent and Masses absent                                                          Assessment and Plan          Diagnosis Orders   1. Encounter for annual routine gynecological examination     2. Irregular menses  drospirenone-ethinyl estradiol (FORTINO) 3-0.02 MG per tablet   3. PMDD (premenstrual dysphoric disorder)     4. Hirsutism     5. Screening mammogram, encounter for  [facility] GREG DIGITAL SCREEN BILATERAL       will continue fortino, discusssed possible change to premphase      I am having Lakeisha Almonte start on drospirenone-ethinyl estradiol. I am also having her maintain her drospirenone-ethinyl estradiol, albuterol sulfate HFA, famotidine, fexofenadine, Fluticasone-Salmeterol, montelukast, escitalopram, pantoprazole, and levothyroxine. Return in about 1 year (around 11/3/2022) for yearly. She was also counseled on her preventative health maintenance recommendations and follow-up. There are no Patient Instructions on file for this visit.     MARLENI Barraza CNM,11/3/2021 8:53 AM

## 2021-12-20 DIAGNOSIS — J45.40 MODERATE PERSISTENT ASTHMA WITHOUT COMPLICATION: ICD-10-CM

## 2021-12-22 RX ORDER — MONTELUKAST SODIUM 10 MG/1
10 TABLET ORAL DAILY
Qty: 30 TABLET | Refills: 5 | Status: SHIPPED | OUTPATIENT
Start: 2021-12-22 | End: 2022-07-07 | Stop reason: SDUPTHER

## 2022-05-03 ENCOUNTER — HOSPITAL ENCOUNTER (OUTPATIENT)
Dept: WOMENS IMAGING | Age: 52
Discharge: HOME | End: 2022-05-05
Payer: COMMERCIAL

## 2022-05-03 DIAGNOSIS — Z12.31 SCREENING MAMMOGRAM, ENCOUNTER FOR: ICD-10-CM

## 2022-05-03 PROCEDURE — 77063 BREAST TOMOSYNTHESIS BI: CPT

## 2022-05-10 PROBLEM — H81.11 BENIGN PAROXYSMAL POSITIONAL VERTIGO OF RIGHT EAR: Status: ACTIVE | Noted: 2022-05-10

## 2022-07-07 DIAGNOSIS — J45.40 MODERATE PERSISTENT ASTHMA WITHOUT COMPLICATION: ICD-10-CM

## 2022-07-07 RX ORDER — MONTELUKAST SODIUM 10 MG/1
10 TABLET ORAL DAILY
Qty: 30 TABLET | Refills: 5 | Status: SHIPPED | OUTPATIENT
Start: 2022-07-07 | End: 2022-08-22 | Stop reason: SDUPTHER

## 2022-07-07 NOTE — TELEPHONE ENCOUNTER
Tasia Cely called in and left a voicemail requesting a refill on her Singulair to be sent to [address]. Next office visit is 8/22/22. Please advise.

## 2022-08-04 NOTE — PROGRESS NOTES
Subjective:      Patient ID: Owen Roach is a 46 y.o. female. HPI  Patient here for follow-up for asthma and history of COVID-19. Patient was last seen in the office in August 2021 per me and in July 2021 per Dr. Ann Le. Patient states that she ran out of her air duo and has not been using. She has been using her albuterol 2-3 times per day. She is on pantoprazole for her acid reflux and continues on Singulair. She continues to endorse shortness of breath at times with wheezing. She endorses daytime fatigue, works midnights and has poor sleep hygiene/routine. She is not interested in COVID vaccines. Medications:   Allegra:  Singulair 10 mg:  Air duo 113-14 mcg-1 puff twice daily  Pepcid 20 mg twice daily:  Albuterol HFA: 3-4 x a week     PRIOR WORKUP:  PFT:  PFT 8/6/2021: FVC 2.97 (90% of predicted), FEV1 2.37 (91% of predicted), FEV1/FVC ratio is normal.  Postbronchodilator no significant response. Residual volume 2.31 (136% of predicted), TLC 5.26 (110% of predicted). Diffusion capacity 21.37 (99% of predicted). Final impression: Normal spirometry with no clinical bronchodilator response. Lung volume with slight elevation suggestive of mild airway trapping. Normal diffusion capacity. CT Imaging:  Chest x-ray 6/28/2021: Negative for acute cardiopulmonary disease. No pneumothorax. No CT imaging on chart     Sleep Study:     Laboratory Evaluation:  Respiratory allergen panel 7/26/2021:  -IgE 515-elevated  -Alternaria Alternata 7.70-elevated  -Maple/Box Elder tree 0.5-elevated  -Mountain cedar tree 0.51-elevated  -[place] tree 0.40-elevated  -Pigweed IgE 0.55-elevated  -Daryl grass 0.40-elevated  -Hormodendrum IgE 0.58-elevated  -Elm tree 0.42-elevated  -Oak tree IgE 0.51-elevated  -Aspergillus fumigatus 0.69-elevated  D. Pteronyssinus 3.54-elevated  -D. Farinae 3.38-elevated  -White benigno tree IgE 0.70-elevated  -P.  Notatum-0.40-elevated  -Short ragweed Connections: Not on file   Intimate Partner Violence: Not on file   Housing Stability: Not on file       Review of Systems   Constitutional:  Positive for fatigue. Negative for fever. Patient works midnights and has an alternating sleep schedule   HENT:  Negative for postnasal drip, rhinorrhea, sinus pressure and sinus pain. Respiratory:  Negative for chest tightness, wheezing and stridor. Shortness of breath and wheezing with activity   Cardiovascular:  Negative for chest pain and leg swelling. Gastrointestinal:  Negative for constipation, diarrhea, nausea and vomiting. Genitourinary:  Negative for dysuria, frequency and urgency. Musculoskeletal:  Negative for arthralgias, joint swelling and myalgias. Skin:  Negative for rash. Neurological:  Negative for dizziness, speech difficulty and headaches. Hematological:  Does not bruise/bleed easily. Psychiatric/Behavioral:  Negative for agitation, hallucinations, sleep disturbance and suicidal ideas.       Objective:       Physical Exam  General appearance - alert, well appearing, and in no distress, oriented to person, place, and time, and overweight  Mental status - alert, oriented to person, place, and time, normal mood, behavior, speech, dress, motor activity, and thought processes  Eyes - pupils equal and reactive, extraocular eye movements intact  Ears -not examined  Nose - normal and patent, no erythema, discharge or polyps  Mouth - mucous membranes moist, pharynx normal without lesions  Neck - supple, no significant adenopathy  Chest - clear to auscultation, no wheezes, rales or rhonchi, symmetric air entry  Heart -normal rate, regular rhythm, normal S1, S2, no murmurs, rubs, clicks or gallops  Abdomen - soft, nontender, nondistended, no masses or organomegaly  Neuro- alert, oriented, normal speech, no focal findings or movement disorder noted}  Extremities - peripheral pulses normal, no pedal edema, no clubbing or cyanosis  Skin - normal coloration and turgor, no rashes, no suspicious skin lesions noted     Wt Readings from Last 3 Encounters:   08/22/22 180 lb 3.2 oz (81.7 kg)   05/10/22 172 lb (78 kg)   11/15/21 169 lb 8 oz (76.9 kg)       Results for orders placed or performed in visit on 11/15/21   Glucose   Result Value Ref Range    Glucose 80 65 - 99 mg/dL   Lipid Panel w/ Reflex Direct LDL   Result Value Ref Range    Cholesterol 205 (H) 150 - 200 mg/dL    Triglycerides 186 (H) 27 - 150 mg/dL    HDL 53 >39 mg/dL    LDL Calculated 115 <130 mg/dL    VLDL Cholesterol Calculated 37 (H) 0 - 30 mg/dL    LDl/HDL Ratio 2.2 <3.5    Chol/HDL Ratio 3.9 1.0 - 5.0   TSH without Reflex   Result Value Ref Range    TSH 3.72 0.49 - 4.67 uIU/mL       No results found. Assessment:      1. Moderate persistent asthma without complication    2. History of COVID-19    3. Gastroesophageal reflux disease without esophagitis    4. COVID-19 vaccination refused    5. Shift work sleep disorder          Plan:      Medications reviewed, continue as ordered. Refills for air duo, Singulair and albuterol sent to pharmacy  Educated and clarified the medication use. Recommend influenza and pneumococcal vaccines. Patient refusing COVID-vaccine discussed strategies to protect against Covid 19. Maintain an active lifestyle. Patient's questions were answered to her satisfaction.   Pulmonary function tests were reviewed   Chest x-ray reviewed  We'll see the patient back in 12 months          Electronically signed by MARLENI Zurita CNP on 8/22/2022 at 10:30 AM

## 2022-08-22 ENCOUNTER — OFFICE VISIT (OUTPATIENT)
Dept: PULMONOLOGY | Age: 52
End: 2022-08-22
Payer: COMMERCIAL

## 2022-08-22 VITALS
BODY MASS INDEX: 33.16 KG/M2 | HEART RATE: 76 BPM | OXYGEN SATURATION: 97 % | TEMPERATURE: 97.5 F | WEIGHT: 180.2 LBS | HEIGHT: 62 IN | DIASTOLIC BLOOD PRESSURE: 72 MMHG | SYSTOLIC BLOOD PRESSURE: 118 MMHG | RESPIRATION RATE: 16 BRPM

## 2022-08-22 DIAGNOSIS — K21.9 GASTROESOPHAGEAL REFLUX DISEASE WITHOUT ESOPHAGITIS: ICD-10-CM

## 2022-08-22 DIAGNOSIS — Z86.16 HISTORY OF COVID-19: ICD-10-CM

## 2022-08-22 DIAGNOSIS — J45.40 MODERATE PERSISTENT ASTHMA WITHOUT COMPLICATION: Primary | ICD-10-CM

## 2022-08-22 DIAGNOSIS — Z28.21 COVID-19 VACCINATION REFUSED: ICD-10-CM

## 2022-08-22 DIAGNOSIS — G47.26 SHIFT WORK SLEEP DISORDER: ICD-10-CM

## 2022-08-22 PROCEDURE — 99214 OFFICE O/P EST MOD 30 MIN: CPT | Performed by: NURSE PRACTITIONER

## 2022-08-22 RX ORDER — MONTELUKAST SODIUM 10 MG/1
10 TABLET ORAL DAILY
Qty: 30 TABLET | Refills: 11 | Status: SHIPPED | OUTPATIENT
Start: 2022-08-22

## 2022-08-22 RX ORDER — FLUTICASONE PROPIONATE AND SALMETEROL 113; 14 UG/1; UG/1
1 POWDER, METERED RESPIRATORY (INHALATION) 2 TIMES DAILY
Qty: 1 EACH | Refills: 11 | Status: SHIPPED | OUTPATIENT
Start: 2022-08-22

## 2022-08-22 RX ORDER — ALBUTEROL SULFATE 90 UG/1
2 AEROSOL, METERED RESPIRATORY (INHALATION) EVERY 4 HOURS PRN
Qty: 18 G | Refills: 11 | Status: SHIPPED | OUTPATIENT
Start: 2022-08-22

## 2022-08-22 ASSESSMENT — ENCOUNTER SYMPTOMS
CHEST TIGHTNESS: 0
SINUS PAIN: 0
STRIDOR: 0
CONSTIPATION: 0
VOMITING: 0
WHEEZING: 0
RHINORRHEA: 0
SINUS PRESSURE: 0
NAUSEA: 0
DIARRHEA: 0

## 2022-08-22 NOTE — PATIENT INSTRUCTIONS
SURVEY:    You may be receiving a survey from [employer] regarding your visit today. Please complete the survey to enable us to provide the highest quality of care to you and your family. If you cannot score us a very good on any question, please call the office to discuss how we could have made your experience a very good one. Thank you.

## 2022-11-08 ENCOUNTER — OFFICE VISIT (OUTPATIENT)
Dept: OBGYN | Age: 52
End: 2022-11-08
Payer: COMMERCIAL

## 2022-11-08 VITALS
DIASTOLIC BLOOD PRESSURE: 74 MMHG | WEIGHT: 181.2 LBS | HEIGHT: 62 IN | SYSTOLIC BLOOD PRESSURE: 126 MMHG | BODY MASS INDEX: 33.34 KG/M2

## 2022-11-08 DIAGNOSIS — Z01.419 ENCOUNTER FOR ANNUAL ROUTINE GYNECOLOGICAL EXAMINATION: Primary | ICD-10-CM

## 2022-11-08 PROCEDURE — 99396 PREV VISIT EST AGE 40-64: CPT | Performed by: ADVANCED PRACTICE MIDWIFE

## 2022-11-08 ASSESSMENT — ENCOUNTER SYMPTOMS
ABDOMINAL PAIN: 0
SHORTNESS OF BREATH: 0
BACK PAIN: 0

## 2022-11-08 NOTE — PROGRESS NOTES
YEARLY PHYSICAL    Date of service: 2022    Laney Hollingsworth  Is a 46 y.o.  , female    PT's PCP is: Cuauhtemoc Russell MD     : 1970                                             Subjective:       Patient's last menstrual period was 2022 (exact date). Are your menses regular: yes    OB History    Para Term  AB Living   3 3 3     3   SAB IAB Ectopic Molar Multiple Live Births             3      # Outcome Date GA Lbr William/2nd Weight Sex Delivery Anes PTL Lv   3 Term 98 40w0d   M Vag-Spont   VANNESSA   2 Term 93 40w0d   M Vag-Spont   VANNESSA   1 Term 10/18/90 40w0d   M Vag-Spont   VANNESSA        Social History     Tobacco Use   Smoking Status Never   Smokeless Tobacco Never        Social History     Substance and Sexual Activity   Alcohol Use Yes    Comment: occasional       Family History   Problem Relation Age of Onset    Other Mother         liver disease, pneumonia    Diabetes Father     Breast Cancer Half-Sister     Other Other         1/2 sistre breast cancer. No family h/o ovarian cancer or DVT. Any family history of breast or ovarian cancer: Yes    Any family history of blood clots: No    Have you had a positive covid test: Yes    Have you had the covid immunization: No      Allergies: Patient has no known allergies.       Current Outpatient Medications:     Loratadine-Pseudoephedrine (CLARITIN-D 12 HOUR PO), Take by mouth, Disp: , Rfl:     Fluticasone-Salmeterol,sensor, (AIRDUO DIGIHALER) 113-14 MCG/ACT AEPB, Inhale 1 puff into the lungs in the morning and at bedtime, Disp: 1 each, Rfl: 11    montelukast (SINGULAIR) 10 MG tablet, Take 1 tablet by mouth daily, Disp: 30 tablet, Rfl: 11    albuterol sulfate HFA (PROVENTIL;VENTOLIN;PROAIR) 108 (90 Base) MCG/ACT inhaler, Inhale 2 puffs into the lungs every 4 hours as needed for Wheezing, Disp: 18 g, Rfl: 11    levothyroxine (SYNTHROID) 50 MCG tablet, Take 1 tablet by mouth Daily, Disp: 90 tablet, Rfl: 3    escitalopram (LEXAPRO) 10 MG tablet, Take 1 tablet by mouth daily, Disp: 90 tablet, Rfl: 3    Social History     Substance and Sexual Activity   Sexual Activity Yes    Partners: Male    Comment: partner vasectomy       Any bleeding or pain with intercourse: No    Last Yearly:  2021    Last pap: 2020    Last HPV: 2020    Last Mammogram: 05/04/2022    Last Marshall Reins never    Last colorectal screen- type:never*  date  never    Do you do self breast exams: No    Past Medical History:   Diagnosis Date    Allergic rhinitis     Asthma     REMOTE    COVID-19 virus infection     2020, 2021    Thyroid disease        History reviewed. No pertinent surgical history. Family History   Problem Relation Age of Onset    Other Mother         liver disease, pneumonia    Diabetes Father     Breast Cancer Half-Sister     Other Other         1/2 sistre breast cancer. No family h/o ovarian cancer or DVT. Chief Complaint   Patient presents with    Gynecologic Exam     Yearly exam. Last pap 10/29/2020 negative, HPV not detected. PE:  Vital Signs  Blood pressure 126/74, height 5' 2\" (1.575 m), weight 181 lb 3.2 oz (82.2 kg), last menstrual period 11/02/2022, not currently breastfeeding. Estimated body mass index is 33.14 kg/m² as calculated from the following:    Height as of this encounter: 5' 2\" (1.575 m). Weight as of this encounter: 181 lb 3.2 oz (82.2 kg). Labs:    No results found for this visit on 11/08/22. No data recorded    NURSE: Jameson POLANCO    HPI: here for annual gyn exam, denies c/o, periods \"normal\"    Review of Systems   Constitutional: Negative. HENT:  Negative for congestion. Respiratory:  Negative for shortness of breath. Cardiovascular:  Negative for chest pain. Gastrointestinal:  Negative for abdominal pain. Genitourinary:  Negative for dysuria. Musculoskeletal:  Negative for back pain. Skin:  Negative for rash. Neurological:  Negative for headaches. Psychiatric/Behavioral:  The patient is not nervous/anxious. Objective  Lymphatic:   no lymphadenopathy  Heent:   negative   Cor: regular rate and rhythm, no murmurs              Pul:clear to auscultation bilaterally- no wheezes, rales or rhonchi, normal air movement, no respiratory distress      GI: Abdomen soft, non-tender. BS normal. No masses,  No organomegaly           Extremities: normal strength, tone, and muscle mass   Breasts: Breast:normal appearance, no masses or tenderness   Pelvic Exam: GENITAL/URINARY:  External Genitalia:  General appearance; normal, Hair distribution; normal, Lesions absent  Urethral Meatus:  Size normal, Location normal, Lesions absent, Prolapse absent  Urethra: Fullness absent, Masses absent  Bladder:  Fullness absent, Masses absent, Tenderness absent, Cystocele absent  Vagina:  General appearance normal, Estrogen effect normal, Discharge absent, Lesions absent, Pelvic support normal  Cervix:  General appearance normal, Lesions absent, Discharge absent, Tenderness absent, Enlargement absent, Nodularity absent  Uterus:  Size normal, Tenderness absent  Adenexa: Masses absent, Tenderness absent  Anus/Perineum:  Lesions absent and Masses absent                                                          Assessment and Plan          Diagnosis Orders   1. Encounter for annual routine gynecological examination                  I have discontinued Han Tello Arrobertsandrine's fexofenadine and pantoprazole. I am also having her maintain her escitalopram, levothyroxine, AirDuo Digihaler, montelukast, albuterol sulfate HFA, and Loratadine-Pseudoephedrine (CLARITIN-D 12 HOUR PO). Return in about 1 year (around 11/8/2023) for yearly. She was also counseled on her preventative health maintenance recommendations and follow-up. There are no Patient Instructions on file for this visit.     MARLENI Butterfield CNM,11/13/2022 3:32 PM

## 2022-12-01 ENCOUNTER — TELEPHONE (OUTPATIENT)
Dept: GASTROENTEROLOGY | Age: 52
End: 2022-12-01

## 2023-08-28 DIAGNOSIS — J45.40 MODERATE PERSISTENT ASTHMA WITHOUT COMPLICATION: ICD-10-CM

## 2023-08-28 RX ORDER — MONTELUKAST SODIUM 10 MG/1
TABLET ORAL
Qty: 30 TABLET | Refills: 0 | Status: SHIPPED | OUTPATIENT
Start: 2023-08-28

## 2023-08-28 NOTE — TELEPHONE ENCOUNTER
Informed patient of the note from DENA Adkins Signs CNP and she chose to make an appt. Patient was scheduled for 9/6/2023.

## 2023-09-06 ENCOUNTER — OFFICE VISIT (OUTPATIENT)
Dept: PULMONOLOGY | Age: 53
End: 2023-09-06
Payer: COMMERCIAL

## 2023-09-06 VITALS
RESPIRATION RATE: 16 BRPM | OXYGEN SATURATION: 98 % | SYSTOLIC BLOOD PRESSURE: 115 MMHG | WEIGHT: 187.6 LBS | HEIGHT: 62 IN | HEART RATE: 69 BPM | TEMPERATURE: 96.1 F | BODY MASS INDEX: 34.52 KG/M2 | DIASTOLIC BLOOD PRESSURE: 66 MMHG

## 2023-09-06 DIAGNOSIS — E66.09 CLASS 1 OBESITY DUE TO EXCESS CALORIES WITH SERIOUS COMORBIDITY AND BODY MASS INDEX (BMI) OF 34.0 TO 34.9 IN ADULT: ICD-10-CM

## 2023-09-06 DIAGNOSIS — J45.40 MODERATE PERSISTENT ASTHMA WITHOUT COMPLICATION: Primary | ICD-10-CM

## 2023-09-06 DIAGNOSIS — K21.9 GASTROESOPHAGEAL REFLUX DISEASE WITHOUT ESOPHAGITIS: ICD-10-CM

## 2023-09-06 PROCEDURE — 99213 OFFICE O/P EST LOW 20 MIN: CPT | Performed by: INTERNAL MEDICINE

## 2023-09-06 RX ORDER — ALBUTEROL SULFATE 90 UG/1
2 AEROSOL, METERED RESPIRATORY (INHALATION) EVERY 6 HOURS PRN
Qty: 18 G | Refills: 11 | Status: SHIPPED | OUTPATIENT
Start: 2023-09-06

## 2023-09-06 RX ORDER — FLUTICASONE PROPIONATE AND SALMETEROL 113; 14 UG/1; UG/1
1 POWDER, METERED RESPIRATORY (INHALATION) 2 TIMES DAILY
Qty: 1 EACH | Refills: 11 | Status: SHIPPED | OUTPATIENT
Start: 2023-09-06

## 2023-09-06 RX ORDER — MONTELUKAST SODIUM 10 MG/1
10 TABLET ORAL NIGHTLY
Qty: 30 TABLET | Refills: 11 | Status: SHIPPED | OUTPATIENT
Start: 2023-09-06

## 2023-09-06 ASSESSMENT — ENCOUNTER SYMPTOMS
SHORTNESS OF BREATH: 1
CHEST TIGHTNESS: 1
WHEEZING: 1
EYES NEGATIVE: 1
GASTROINTESTINAL NEGATIVE: 1

## 2023-09-06 NOTE — PATIENT INSTRUCTIONS
SURVEY:    You may be receiving a survey from [employer] regarding your visit today. Please complete the survey to enable us to provide the highest quality of care to you and your family. If you cannot score us a very good on any question, please call the office to discuss how we could have made your experience a very good one. Thank you.

## 2023-11-09 ENCOUNTER — HOSPITAL ENCOUNTER (OUTPATIENT)
Age: 53
Discharge: HOME | End: 2023-11-09
Payer: COMMERCIAL

## 2023-11-09 ENCOUNTER — OFFICE VISIT (OUTPATIENT)
Dept: OBGYN | Age: 53
End: 2023-11-09
Payer: COMMERCIAL

## 2023-11-09 VITALS
WEIGHT: 189 LBS | SYSTOLIC BLOOD PRESSURE: 122 MMHG | DIASTOLIC BLOOD PRESSURE: 72 MMHG | BODY MASS INDEX: 34.78 KG/M2 | HEIGHT: 62 IN

## 2023-11-09 DIAGNOSIS — Z12.31 SCREENING MAMMOGRAM, ENCOUNTER FOR: ICD-10-CM

## 2023-11-09 DIAGNOSIS — Z01.419 ENCOUNTER FOR ANNUAL ROUTINE GYNECOLOGICAL EXAMINATION: Primary | ICD-10-CM

## 2023-11-09 DIAGNOSIS — N92.6 IRREGULAR PERIODS: ICD-10-CM

## 2023-11-09 LAB
ESTRADIOL LEVEL: 449 PG/ML
FSH SERPL-ACNC: 2.4 MIU/ML
LH SERPL-ACNC: 4.4 MIU/ML (ref 1.7–8.6)

## 2023-11-09 PROCEDURE — 83002 ASSAY OF GONADOTROPIN (LH): CPT

## 2023-11-09 PROCEDURE — 99396 PREV VISIT EST AGE 40-64: CPT | Performed by: ADVANCED PRACTICE MIDWIFE

## 2023-11-09 PROCEDURE — 36415 COLL VENOUS BLD VENIPUNCTURE: CPT

## 2023-11-09 PROCEDURE — 83001 ASSAY OF GONADOTROPIN (FSH): CPT

## 2023-11-09 PROCEDURE — 82670 ASSAY OF TOTAL ESTRADIOL: CPT

## 2023-11-09 NOTE — PROGRESS NOTES
YEARLY PHYSICAL    Date of service: 2023    Jatin Garner  Is a 48 y.o.  , female    PT's PCP is: Enoch Roberts MD     : 1970                                             Subjective:       Patient's last menstrual period was 10/19/2023 (approximate). Are your menses regular: yes    OB History    Para Term  AB Living   3 3 3     3   SAB IAB Ectopic Molar Multiple Live Births             3      # Outcome Date GA Lbr William/2nd Weight Sex Delivery Anes PTL Lv   3 Term 98 40w0d   M Vag-Spont   VANNESSA   2 Term 93 40w0d   M Vag-Spont   VANNESSA   1 Term 10/18/90 40w0d   M Vag-Spont   VANNESSA        Social History     Tobacco Use   Smoking Status Never   Smokeless Tobacco Never        Social History     Substance and Sexual Activity   Alcohol Use Yes    Comment: occasional       Family History   Problem Relation Age of Onset    Other Mother         liver disease, pneumonia    Diabetes Father     Breast Cancer Half-Sister     Other Other         1/2 sistre breast cancer. No family h/o ovarian cancer or DVT. Any family history of breast or ovarian cancer: No    Any family history of blood clots: No    Have you had a positive covid test: Yes    Have you had the covid immunization: No      Allergies: Patient has no known allergies.       Current Outpatient Medications:     Fexofenadine-Pseudoephedrine (ALLEGRA-D 12 HOUR PO), Take by mouth daily, Disp: , Rfl:     montelukast (SINGULAIR) 10 MG tablet, Take 1 tablet by mouth nightly, Disp: 30 tablet, Rfl: 11    Fluticasone-Salmeterol,sensor, (AIRDUO DIGIHALER) 113-14 MCG/ACT AEPB, Inhale 1 puff into the lungs in the morning and at bedtime, Disp: 1 each, Rfl: 11    albuterol sulfate HFA (PROVENTIL;VENTOLIN;PROAIR) 108 (90 Base) MCG/ACT inhaler, Inhale 2 puffs into the lungs every 6 hours as needed for Wheezing, Disp: 18 g, Rfl: 11    pantoprazole (PROTONIX) 40 MG

## 2023-11-12 ASSESSMENT — ENCOUNTER SYMPTOMS
ABDOMINAL PAIN: 0
SHORTNESS OF BREATH: 0

## 2023-11-21 ENCOUNTER — HOSPITAL ENCOUNTER (OUTPATIENT)
Dept: WOMENS IMAGING | Age: 53
Discharge: HOME | End: 2023-11-23
Payer: COMMERCIAL

## 2023-11-21 VITALS — WEIGHT: 180 LBS | HEIGHT: 62 IN | BODY MASS INDEX: 33.13 KG/M2

## 2023-11-21 DIAGNOSIS — Z12.31 SCREENING MAMMOGRAM, ENCOUNTER FOR: ICD-10-CM

## 2023-11-21 PROCEDURE — 77067 SCR MAMMO BI INCL CAD: CPT

## 2023-11-27 SDOH — ECONOMIC STABILITY: TRANSPORTATION INSECURITY
IN THE PAST 12 MONTHS, HAS LACK OF TRANSPORTATION KEPT YOU FROM MEETINGS, WORK, OR FROM GETTING THINGS NEEDED FOR DAILY LIVING?: NO

## 2023-11-27 SDOH — ECONOMIC STABILITY: FOOD INSECURITY: WITHIN THE PAST 12 MONTHS, THE FOOD YOU BOUGHT JUST DIDN'T LAST AND YOU DIDN'T HAVE MONEY TO GET MORE.: PATIENT DECLINED

## 2023-11-27 SDOH — ECONOMIC STABILITY: FOOD INSECURITY: WITHIN THE PAST 12 MONTHS, YOU WORRIED THAT YOUR FOOD WOULD RUN OUT BEFORE YOU GOT MONEY TO BUY MORE.: PATIENT DECLINED

## 2023-11-27 SDOH — ECONOMIC STABILITY: INCOME INSECURITY: HOW HARD IS IT FOR YOU TO PAY FOR THE VERY BASICS LIKE FOOD, HOUSING, MEDICAL CARE, AND HEATING?: NOT VERY HARD

## 2023-11-27 SDOH — ECONOMIC STABILITY: HOUSING INSECURITY
IN THE LAST 12 MONTHS, WAS THERE A TIME WHEN YOU DID NOT HAVE A STEADY PLACE TO SLEEP OR SLEPT IN A SHELTER (INCLUDING NOW)?: NO

## 2023-11-27 ASSESSMENT — PATIENT HEALTH QUESTIONNAIRE - PHQ9
2. FEELING DOWN, DEPRESSED OR HOPELESS: NOT AT ALL
SUM OF ALL RESPONSES TO PHQ QUESTIONS 1-9: 0
1. LITTLE INTEREST OR PLEASURE IN DOING THINGS: 0
SUM OF ALL RESPONSES TO PHQ QUESTIONS 1-9: 0
SUM OF ALL RESPONSES TO PHQ QUESTIONS 1-9: 0
1. LITTLE INTEREST OR PLEASURE IN DOING THINGS: NOT AT ALL
SUM OF ALL RESPONSES TO PHQ9 QUESTIONS 1 & 2: 0
2. FEELING DOWN, DEPRESSED OR HOPELESS: 0
SUM OF ALL RESPONSES TO PHQ9 QUESTIONS 1 & 2: 0
SUM OF ALL RESPONSES TO PHQ QUESTIONS 1-9: 0

## 2023-11-30 ENCOUNTER — OFFICE VISIT (OUTPATIENT)
Dept: OBGYN | Age: 53
End: 2023-11-30
Payer: COMMERCIAL

## 2023-11-30 VITALS
HEIGHT: 62 IN | WEIGHT: 189 LBS | SYSTOLIC BLOOD PRESSURE: 120 MMHG | DIASTOLIC BLOOD PRESSURE: 64 MMHG | BODY MASS INDEX: 34.78 KG/M2

## 2023-11-30 DIAGNOSIS — E66.9 OBESITY (BMI 30-39.9): Primary | ICD-10-CM

## 2023-11-30 DIAGNOSIS — R45.86 MOOD CHANGES: ICD-10-CM

## 2023-11-30 PROCEDURE — 99213 OFFICE O/P EST LOW 20 MIN: CPT | Performed by: ADVANCED PRACTICE MIDWIFE

## 2023-12-06 ENCOUNTER — TELEPHONE (OUTPATIENT)
Dept: GASTROENTEROLOGY | Age: 53
End: 2023-12-06

## 2024-02-08 ENCOUNTER — OFFICE VISIT (OUTPATIENT)
Dept: GASTROENTEROLOGY | Age: 54
End: 2024-02-08
Payer: COMMERCIAL

## 2024-02-08 VITALS
OXYGEN SATURATION: 98 % | SYSTOLIC BLOOD PRESSURE: 112 MMHG | DIASTOLIC BLOOD PRESSURE: 75 MMHG | WEIGHT: 190.6 LBS | HEART RATE: 78 BPM | RESPIRATION RATE: 18 BRPM | HEIGHT: 62 IN | BODY MASS INDEX: 35.07 KG/M2

## 2024-02-08 DIAGNOSIS — R14.3 FLATULENCE, ERUCTATION AND GAS PAIN: Primary | ICD-10-CM

## 2024-02-08 DIAGNOSIS — Z01.818 PRE-OP TESTING: ICD-10-CM

## 2024-02-08 DIAGNOSIS — R14.1 FLATULENCE, ERUCTATION AND GAS PAIN: Primary | ICD-10-CM

## 2024-02-08 DIAGNOSIS — Z12.11 COLON CANCER SCREENING: ICD-10-CM

## 2024-02-08 DIAGNOSIS — R14.2 FLATULENCE, ERUCTATION AND GAS PAIN: Primary | ICD-10-CM

## 2024-02-08 PROCEDURE — 99203 OFFICE O/P NEW LOW 30 MIN: CPT | Performed by: NURSE PRACTITIONER

## 2024-02-08 ASSESSMENT — ENCOUNTER SYMPTOMS
ALLERGIC/IMMUNOLOGIC NEGATIVE: 1
COUGH: 0
TROUBLE SWALLOWING: 0
WHEEZING: 0
SHORTNESS OF BREATH: 0

## 2024-02-08 NOTE — PATIENT INSTRUCTIONS
SURVEY:    You may be receiving a [facility] today.    You may get this in the mail, through your MyChart, or in your email.     Please complete the survey to enable us to provide the highest quality of care to you and your family.    If you cannot score us a very good (5 Stars) on any question, please call the office to discuss how we could of made your experience exceptional.    Thank you!    MD Katerine Pinzon, APRN-AGNES Flowers LPN Michelle, LPN    Phone: [phone]  Fax: [phone]    Office Hours:   M-TH 8-5, F: 8-12

## 2024-02-08 NOTE — PROGRESS NOTES
Future  - COLONOSCOPY (Screening); Future    3. Pre-op testing  - EKG 12 Lead; Future    4. Additional recommendations based on findings.      Informed consent was obtained with a discussion about potential risks and complications of the procedure. Patient verbalized understanding and willingness to continue with the procedure scheduling.     Spent 20 minutes with the patient with greater than 50 percent of the time was spent on face-to-face time in discussion with the patient regarding diagnostic options/results, treatment options, counseling, and follow-up plan.      Katerine Vazquez, APRN - CNP    *This report was transcribed using voice recognition software. Every effort was made to ensure accuracy; however, inadvertent computerized transcription errors may be present.

## 2024-02-13 ENCOUNTER — HOSPITAL ENCOUNTER (OUTPATIENT)
Dept: GENERAL RADIOLOGY | Age: 54
Discharge: HOME | End: 2024-02-15
Payer: COMMERCIAL

## 2024-02-13 ENCOUNTER — HOSPITAL ENCOUNTER (OUTPATIENT)
Age: 54
Discharge: HOME | End: 2024-02-15

## 2024-02-13 DIAGNOSIS — R52 PAIN: ICD-10-CM

## 2024-02-13 PROCEDURE — 73560 X-RAY EXAM OF KNEE 1 OR 2: CPT

## 2024-06-26 ENCOUNTER — HOSPITAL ENCOUNTER (OUTPATIENT)
Age: 54
Discharge: HOME | End: 2024-06-26
Payer: COMMERCIAL

## 2024-06-26 ENCOUNTER — HOSPITAL ENCOUNTER (OUTPATIENT)
Age: 54
Setting detail: SPECIMEN
Discharge: HOME | End: 2024-06-26
Payer: COMMERCIAL

## 2024-06-26 DIAGNOSIS — Z01.818 PRE-OP TESTING: ICD-10-CM

## 2024-06-26 DIAGNOSIS — Z12.11 COLON CANCER SCREENING: ICD-10-CM

## 2024-06-26 LAB
EKG ATRIAL RATE: 72 BPM
EKG P AXIS: 24 DEGREES
EKG P-R INTERVAL: 172 MS
EKG Q-T INTERVAL: 392 MS
EKG QRS DURATION: 86 MS
EKG QTC CALCULATION (BAZETT): 429 MS
EKG R AXIS: -10 DEGREES
EKG T AXIS: 23 DEGREES
EKG VENTRICULAR RATE: 72 BPM

## 2024-06-26 PROCEDURE — 87338 HPYLORI STOOL AG IA: CPT

## 2024-06-26 PROCEDURE — 93005 ELECTROCARDIOGRAM TRACING: CPT

## 2024-06-27 ENCOUNTER — TELEPHONE (OUTPATIENT)
Dept: GASTROENTEROLOGY | Age: 54
End: 2024-06-27

## 2024-06-27 LAB
MICROORGANISM/AGENT SPEC: NEGATIVE
SPECIMEN DESCRIPTION: NORMAL

## 2024-06-27 NOTE — TELEPHONE ENCOUNTER
----- Message from MARLENI Sanchez - CNP sent at 6/27/2024  1:45 PM EDT -----  Please notify patient that her H. pylori is negative.  Thanks, Katerine.

## 2024-06-28 NOTE — PROGRESS NOTES
Patient states they received their colon prep instructions and home medications that are to be taken on the day of their procedure with a small sip of water only, from the physician's office. Instructed pt to use inhaler and to take lexapro, synthroid, and protonix with a small sip of water prior to arriving to the hospital the day of surgery.

## 2024-07-10 ENCOUNTER — ANESTHESIA EVENT (OUTPATIENT)
Dept: OPERATING ROOM | Age: 54
End: 2024-07-10
Payer: COMMERCIAL

## 2024-07-11 ENCOUNTER — ANESTHESIA (OUTPATIENT)
Dept: OPERATING ROOM | Age: 54
End: 2024-07-11
Payer: COMMERCIAL

## 2024-07-11 ENCOUNTER — HOSPITAL ENCOUNTER (OUTPATIENT)
Age: 54
Setting detail: OUTPATIENT SURGERY
Discharge: HOME | End: 2024-07-11
Attending: INTERNAL MEDICINE | Admitting: INTERNAL MEDICINE
Payer: COMMERCIAL

## 2024-07-11 VITALS
HEIGHT: 62 IN | DIASTOLIC BLOOD PRESSURE: 85 MMHG | OXYGEN SATURATION: 100 % | BODY MASS INDEX: 30.36 KG/M2 | WEIGHT: 165 LBS | HEART RATE: 72 BPM | RESPIRATION RATE: 14 BRPM | SYSTOLIC BLOOD PRESSURE: 131 MMHG | TEMPERATURE: 96.8 F

## 2024-07-11 PROCEDURE — 45330 DIAGNOSTIC SIGMOIDOSCOPY: CPT | Performed by: INTERNAL MEDICINE

## 2024-07-11 PROCEDURE — 2580000003 HC RX 258: Performed by: NURSE ANESTHETIST, CERTIFIED REGISTERED

## 2024-07-11 PROCEDURE — 6360000002 HC RX W HCPCS: Performed by: NURSE ANESTHETIST, CERTIFIED REGISTERED

## 2024-07-11 PROCEDURE — 2500000003 HC RX 250 WO HCPCS: Performed by: NURSE ANESTHETIST, CERTIFIED REGISTERED

## 2024-07-11 RX ORDER — SODIUM CHLORIDE, SODIUM LACTATE, POTASSIUM CHLORIDE, CALCIUM CHLORIDE 600; 310; 30; 20 MG/100ML; MG/100ML; MG/100ML; MG/100ML
INJECTION, SOLUTION INTRAVENOUS CONTINUOUS
Status: DISCONTINUED | OUTPATIENT
Start: 2024-07-11 | End: 2024-07-11 | Stop reason: HOSPADM

## 2024-07-11 RX ORDER — LIDOCAINE HYDROCHLORIDE 20 MG/ML
INJECTION, SOLUTION EPIDURAL; INFILTRATION; INTRACAUDAL; PERINEURAL PRN
Status: DISCONTINUED | OUTPATIENT
Start: 2024-07-11 | End: 2024-07-11 | Stop reason: SDUPTHER

## 2024-07-11 RX ORDER — PROPOFOL 10 MG/ML
INJECTION, EMULSION INTRAVENOUS PRN
Status: DISCONTINUED | OUTPATIENT
Start: 2024-07-11 | End: 2024-07-11 | Stop reason: SDUPTHER

## 2024-07-11 RX ADMIN — SODIUM CHLORIDE, POTASSIUM CHLORIDE, SODIUM LACTATE AND CALCIUM CHLORIDE: 600; 310; 30; 20 INJECTION, SOLUTION INTRAVENOUS at 12:21

## 2024-07-11 RX ADMIN — PROPOFOL 125 MCG/KG/MIN: 10 INJECTION, EMULSION INTRAVENOUS at 13:05

## 2024-07-11 RX ADMIN — PROPOFOL 75 MG: 10 INJECTION, EMULSION INTRAVENOUS at 13:04

## 2024-07-11 RX ADMIN — PROPOFOL 50 MG: 10 INJECTION, EMULSION INTRAVENOUS at 13:10

## 2024-07-11 RX ADMIN — LIDOCAINE HYDROCHLORIDE 100 MG: 20 INJECTION, SOLUTION EPIDURAL; INFILTRATION; INTRACAUDAL; PERINEURAL at 13:04

## 2024-07-11 ASSESSMENT — PAIN SCALES - GENERAL
PAINLEVEL_OUTOF10: 0
PAINLEVEL_OUTOF10: 0

## 2024-07-11 ASSESSMENT — PAIN - FUNCTIONAL ASSESSMENT
PAIN_FUNCTIONAL_ASSESSMENT: 0-10
PAIN_FUNCTIONAL_ASSESSMENT: 0-10

## 2024-07-11 NOTE — ANESTHESIA PRE PROCEDURE
Department of Anesthesiology  Preprocedure Note       Name:  Lakeisha Almonte   Age:  53 y.o.  :  1970                                          MRN:  [id number]         Date:  2024      Surgeon: Surgeon(s):  Kayla Torres MD    Procedure: Procedure(s):  COLORECTAL CANCER SCREENING, NOT HIGH RISK    Medications prior to admission:   Prior to Admission medications    Medication Sig Start Date End Date Taking? Authorizing Provider   levothyroxine (SYNTHROID) 50 MCG tablet Take 1 tablet by mouth daily 6/10/24   Varun Mesa MD   pantoprazole (PROTONIX) 40 MG tablet Take 1 tablet by mouth every morning (before breakfast) 24   Varun Mesa MD   montelukast (SINGULAIR) 10 MG tablet Take 1 tablet by mouth nightly 23   Cr Dudley DO   Fluticasone-Salmeterol,sensor, (AIRDUO DIGIHALER) 113-14 MCG/ACT AEPB Inhale 1 puff into the lungs in the morning and at bedtime 23   Cr Dudley DO   albuterol sulfate HFA (PROVENTIL;VENTOLIN;PROAIR) 108 (90 Base) MCG/ACT inhaler Inhale 2 puffs into the lungs every 6 hours as needed for Wheezing 23   Cr Dudley DO   escitalopram (LEXAPRO) 10 MG tablet Take 1 tablet by mouth daily  Patient taking differently: Take 0.5 tablets by mouth daily 22   Varun Mesa MD       Current medications:    Current Facility-Administered Medications   Medication Dose Route Frequency Provider Last Rate Last Admin    lactated ringers IV soln infusion   IntraVENous Continuous Aurelia Chan APRN - CRNA 100 mL/hr at 24 1221 New Bag at 24 1221       Allergies:  No Known Allergies    Problem List:    Patient Active Problem List   Diagnosis Code    Benign paroxysmal positional vertigo of right ear H81.11       Past Medical History:        Diagnosis Date    Allergic rhinitis     Anxiety     Asthma     REMOTE    COVID-19 virus infection     2021    Hypothyroidism     Menopausal symptoms     Thyroid disease        Past Surgical

## 2024-07-11 NOTE — ANESTHESIA POSTPROCEDURE EVALUATION
Department of Anesthesiology  Postprocedure Note    Patient: Lakeisha Almonte  MRN: [id number]  YOB: 1970  Date of evaluation: 7/11/2024    Procedure Summary       Date: 07/11/24 Room / Location: [place] [address] / [facility]    Anesthesia Start: 1300 Anesthesia Stop: 1328    Procedure: ANAL PROCTO SIGMOIDOSCOPY FLEXIBLE   with clip x 2 (Abdomen) Diagnosis:       Colon cancer screening      (Colon cancer screening [Z12.11])    Surgeons: Kayla Torres MD Responsible Provider: Awa Oneill APRN - CRNA    Anesthesia Type: TIVA, General ASA Status: 2            Anesthesia Type: TIVA, General    Nathalie Phase I: Nathalie Score: 10    Nathalie Phase II: Nathalie Score: 10    Anesthesia Post Evaluation    Patient location during evaluation: PACU  Patient participation: complete - patient participated  Level of consciousness: awake and alert  Pain score: 0  Airway patency: patent  Nausea & Vomiting: no vomiting and no nausea  Cardiovascular status: blood pressure returned to baseline  Respiratory status: acceptable  Hydration status: stable    No notable events documented.

## 2024-07-11 NOTE — PROGRESS NOTES
Discharge instructions along with clip education given to patient and patients . Patient and  both verbalize understanding and that all questions have been answered at this time.       Discharge Criteria    Inpatients must meet Criteria 1 through 7. All other patients are either YES or N/A. If a NO is chosen then Anesthesia or Surgeon must be notified.      1.  Minimum 30 minutes after last dose of sedative medication.    Yes      2.  Systolic BP between 90 - 160. Diastolic BP between 60 - 90.    Yes      3.  Pulse between 60 - 120    Yes      4.  Respirations between 8 - 25.    Yes      5.  SpO2 92% - 100%.    Yes      6.  Able to cough and swallow or return to baseline function.    Yes      7.  Alert and oriented or return to baseline mental status.    Yes      8.  Demonstrates controlled, coordinated movements, ambulates with steady gait, or return to baseline activity function.    Yes      9.  Minimal or no pain or nausea, or at a level tolerable and acceptable to patient.    Yes      10. Takes and retains oral fluids as allowed.    Yes      11. Procedural / perioperative site stable.  Minimal or no bleeding.    Yes          12. If GI endoscopy procedure, minimal or no abdominal distention or passing flatus.    Yes      13. Written discharge instructions and emergency telephone number provided.    Yes      14. Accompanied by a responsible adult.    Yes

## 2024-07-11 NOTE — DISCHARGE INSTRUCTIONS
SAME DAY SURGERY DISCHARGE INSTRUCTIONS    1.  Do not drive or operate hazardous machinery for 24 hours.    2.  Do not make important personal or business decisions for 24 hours.    3.  Do not drink alcoholic beverages for 24 hours.    4.  Do not smoke tobacco products for 24 hours.    5.  Eat light foods (Jell-O, soups, etc....) and drink plenty of fluids (water, Sprite, etc...) up to 8 glasses per day, as you can tolerate.    6.  Limit your activities for 24 hours.  Do not engage in heavy work until your surgeon gives you permission.      7.  Call your surgeon for any questions regarding your surgery.    8.  Patient should not be left alone for 12-24 hours following surgical procedure.    COLONOSCOPY DISCHARGE INSTRUCTIONS:    It's normal to have a feeling of fullness or mild cramping in your abdomen afterwards due to air that is put into your bowel during the procedure.  Mild activities such as walking will help you pass the air.    You may resume your regular diet.    You will receive a letter or phone call with your test results in 2 weeks.  If you have not received a letter or a phone call in 2 weeks please call the office for your results.    CALL THE DOCTOR IF YOU HAVE:     Chest pain or trouble breathing.    Bleeding from your rectum, vomiting or spitting up of blood that is more than a few streaks or red or black stools    A fever above 101F or if you have chills    Pain that is worse or different than any pain you had before the procedure    Nausea or vomiting that lasts for more than 2 hours.        If symptoms are to severe call 911 or go to the nearest Emergency Room.    Clips were placed in your colon as part of the polyp removal process; these clips are metal and can interfere with MRI procedures; the clips will pass as part of a bowel movement after several weeks until that time you have been given an implant card stating when and where the clips are located; please present the card to radiology  1-2 drinks

## 2024-07-11 NOTE — PROGRESS NOTES
1320   Dr unable to advance scope.  Therefore aborted.  Upon pulling the scope out and starting the cleaning process, it was noted there was a rip around the entire end of the scope.  Dr Velez

## 2024-07-11 NOTE — PROGRESS NOTES
Dr. Torres at patients bedside talking with patient and patients  about the equipment malfunction during the procedure. Patient and  verbalized understanding and denies any other questions at this time.

## 2024-07-11 NOTE — OP NOTE
[facility]    COLONOSCOPY    PROCEDURE DATE: 07/11/24    REFERRING PHYSICIAN: No ref. provider found     PRIMARY CARE PROVIDER: Varun Mesa MD    ATTENDING PHYSICIAN: DISHA CONNOR MD     HISTORY: Ms. Lakeisha Almonte is a 53 y.o. female who presents to the  endoscopy unit for colonoscopy. The patient's clinical history is remarkable for vertigo. She is currently medically stable and appropriate for the planned procedure.       PREOPERATIVE DIAGNOSIS: screening for colon cancer.     PROCEDURES:   Transanal Colonoscopy --screening.     POSTPROCEDURE DIAGNOSIS:  Aborted procedure    MEDICATIONS:   MAC per anesthesia      EBL: None      INSTRUMENT: Olympus CF-H190 AL Pediatric flexible Colonoscope.     PREPARATION: The nature and character of the procedure as well as risks, benefits, and alternatives were discussed with the patient and informed consent was obtained. Complications were said to include, but were not limited to: medication allergy, medication reaction, cardiovascular and respiratory problems, bleeding, perforation, infection, and/or missed diagnosis. Following arrival in the endoscopy room, the patient was placed in the left lateral decubitus position and final time-out accomplished in the presence of the nursing staff. Baseline vital signs were obtained and reviewed, and IV sedation was subsequently initiated.       FINDINGS: Rectal examination demonstrated no significant visible external abnormality and digital palpation was unremarkable. Following adequate conscious sedation an intact colonoscope was introduced and advanced under direct visualization to the rectum. Due to difficulty with scope advancement past 30 cm, the scope was withdrawn and a superficial 8mm linear mucosal laceration was noted. One clip was placed and fell off the mucosa. A second clip was successfully placed to approximate the mucosa. The rectal mucosa was examined on withdrawal and no obstructing or

## 2024-07-11 NOTE — H&P
History and Physical    Patient's Name/Date of Birth: Lakeisha Almonte / 1970 (53 y.o.)    MRN: [id number]     Date: July 11, 2024       CHIEF COMPLAINT:  screening for colon cancer      Lakeisha Almonte is a 53 y.o. old female who has a past medical history of   Asthma, hypothyroidism, anxiety, presenting as a new GI referral for colon cancer screening colonoscopy.      Family history of colon cancer: No  Blood in stool: No  Unintentional weight loss: No  Abdominal pain: Yes: Sometimes with eating, will have bloating and cramping. Has increased dietary protein and symptoms are improving.   Prior colonoscopy: No  Constipation history: No  Number of bowel movements a day: 1-2  Change in stool caliber: No  History of GERD or Acid Reflux: Yes for 2 years.  Feels it is well controlled.  Use of PPI's. Yes       Past Medical History:   Diagnosis Date    Allergic rhinitis     Anxiety     Asthma     REMOTE    COVID-19 virus infection     2020, 2021    Hypothyroidism     Menopausal symptoms     Thyroid disease      Past Surgical History:   Procedure Laterality Date    TONSILLECTOMY       Current Facility-Administered Medications   Medication Dose Route Frequency Provider Last Rate Last Admin    lactated ringers IV soln infusion   IntraVENous Continuous Aurelia Chan APRN - CRNA 100 mL/hr at 07/11/24 1221 New Bag at 07/11/24 1221     No Known Allergies  Family History   Problem Relation Age of Onset    Other Mother         liver disease, pneumonia    Diabetes Father     Breast Cancer Half-Sister     Other Other         1/2 sistre breast cancer. No family h/o ovarian cancer or DVT.      Social History     Socioeconomic History    Marital status:      Spouse name: Not on file    Number of children: Not on file    Years of education: Not on file    Highest education level: Not on file   Occupational History     Employer: [employer]   Tobacco Use    Smoking status: Never    Smokeless tobacco: Never   Vaping Use

## 2024-07-11 NOTE — PROGRESS NOTES
SAME DAY SURGERY DISCHARGE INSTRUCTIONS    1.  Do not drive or operate hazardous machinery for 24 hours.    2.  Do not make important personal or business decisions for 24 hours.    3.  Do not drink alcoholic beverages for 24 hours.    4.  Do not smoke tobacco products for 24 hours.    5.  Eat light foods (Jell-O, soups, etc....) and drink plenty of fluids (water, Sprite, etc...) up to 8 glasses per day, as you can tolerate.    6.  Limit your activities for 24 hours.  Do not engage in heavy work until your surgeon gives you permission.      7.  Call your surgeon for any questions regarding your surgery.    8.  Patient should not be left alone for 12-24 hours following surgical procedure.    COLONOSCOPY DISCHARGE INSTRUCTIONS:    It's normal to have a feeling of fullness or mild cramping in your abdomen afterwards due to air that is put into your bowel during the procedure.  Mild activities such as walking will help you pass the air.    You may resume your regular diet.    You will receive a letter or phone call with your test results in 2 weeks.  If you have not received a letter or a phone call in 2 weeks please call the office for your results.    CALL THE DOCTOR IF YOU HAVE:     Chest pain or trouble breathing.    Bleeding from your rectum, vomiting or spitting up of blood that is more than a few streaks or red or black stools    A fever above 101F or if you have chills    Pain that is worse or different than any pain you had before the procedure    Nausea or vomiting that lasts for more than 2 hours.        If symptoms are to severe call 911 or go to the nearest Emergency Room.      Clips were placed in your colon as part of the polyp removal process; these clips are metal and can interfere with MRI procedures; the clips will pass as part of a bowel movement after several weeks until that time you have been given an implant card stating when and where the clips are located; please present the card to radiology

## 2024-09-04 ENCOUNTER — TELEPHONE (OUTPATIENT)
Dept: SURGERY | Age: 54
End: 2024-09-04

## 2024-09-12 ENCOUNTER — TELEPHONE (OUTPATIENT)
Dept: PULMONOLOGY | Age: 54
End: 2024-09-12

## 2024-09-12 ENCOUNTER — OFFICE VISIT (OUTPATIENT)
Dept: PULMONOLOGY | Age: 54
End: 2024-09-12
Payer: COMMERCIAL

## 2024-09-12 VITALS
RESPIRATION RATE: 16 BRPM | HEART RATE: 69 BPM | DIASTOLIC BLOOD PRESSURE: 78 MMHG | TEMPERATURE: 96.4 F | SYSTOLIC BLOOD PRESSURE: 122 MMHG | WEIGHT: 185.2 LBS | HEIGHT: 62 IN | BODY MASS INDEX: 34.08 KG/M2 | OXYGEN SATURATION: 100 %

## 2024-09-12 DIAGNOSIS — R07.89 COSTOCHONDRAL CHEST PAIN: ICD-10-CM

## 2024-09-12 DIAGNOSIS — J45.40 MODERATE PERSISTENT ASTHMA WITHOUT COMPLICATION: ICD-10-CM

## 2024-09-12 DIAGNOSIS — E66.09 CLASS 1 OBESITY DUE TO EXCESS CALORIES WITH SERIOUS COMORBIDITY AND BODY MASS INDEX (BMI) OF 34.0 TO 34.9 IN ADULT: Primary | ICD-10-CM

## 2024-09-12 PROCEDURE — 99213 OFFICE O/P EST LOW 20 MIN: CPT | Performed by: NURSE PRACTITIONER

## 2024-09-12 RX ORDER — ALBUTEROL SULFATE 90 UG/1
2 AEROSOL, METERED RESPIRATORY (INHALATION) EVERY 6 HOURS PRN
Qty: 18 G | Refills: 11 | Status: SHIPPED | OUTPATIENT
Start: 2024-09-12

## 2024-09-12 RX ORDER — FLUTICASONE PROPIONATE AND SALMETEROL 250; 50 UG/1; UG/1
1 POWDER RESPIRATORY (INHALATION) EVERY 12 HOURS
Qty: 60 EACH | Refills: 3 | Status: SHIPPED | OUTPATIENT
Start: 2024-09-12

## 2024-09-12 RX ORDER — MONTELUKAST SODIUM 10 MG/1
10 TABLET ORAL NIGHTLY
Qty: 90 TABLET | Refills: 3 | Status: SHIPPED | OUTPATIENT
Start: 2024-09-12

## 2024-09-12 ASSESSMENT — ENCOUNTER SYMPTOMS
ALLERGIC/IMMUNOLOGIC NEGATIVE: 1
EYES NEGATIVE: 1
GASTROINTESTINAL NEGATIVE: 1

## 2024-11-14 ENCOUNTER — OFFICE VISIT (OUTPATIENT)
Dept: OBGYN | Age: 54
End: 2024-11-14

## 2024-11-14 VITALS
WEIGHT: 186 LBS | BODY MASS INDEX: 34.23 KG/M2 | HEIGHT: 62 IN | DIASTOLIC BLOOD PRESSURE: 84 MMHG | SYSTOLIC BLOOD PRESSURE: 122 MMHG

## 2024-11-14 DIAGNOSIS — R10.9 ABDOMINAL PAIN, UNSPECIFIED ABDOMINAL LOCATION: ICD-10-CM

## 2024-11-14 DIAGNOSIS — Z01.419 ENCOUNTER FOR ANNUAL ROUTINE GYNECOLOGICAL EXAMINATION: Primary | ICD-10-CM

## 2024-11-14 LAB
BILIRUBIN, POC: NEGATIVE
BLOOD URINE, POC: NEGATIVE
CLARITY, POC: CLEAR
COLOR, POC: YELLOW
GLUCOSE URINE, POC: NEGATIVE MG/DL
KETONES, POC: NEGATIVE MG/DL
LEUKOCYTE EST, POC: NEGATIVE
NITRITE, POC: NEGATIVE
PH, POC: 6
PROTEIN, POC: NEGATIVE MG/DL
SPECIFIC GRAVITY, POC: 1.02
UROBILINOGEN, POC: 0.2 MG/DL

## 2024-11-14 ASSESSMENT — ENCOUNTER SYMPTOMS
BACK PAIN: 0
ABDOMINAL PAIN: 0
SHORTNESS OF BREATH: 0

## 2024-11-14 NOTE — PROGRESS NOTES
for women 40  years and older.        DEXA Result (most recent):  No results found for this or any previous visit from the past 3650 days.        Last colorectal screen- type:failed colposcopy  date  02/08/2024 results not completed    Do you do self breast exams: No    Past Medical History:   Diagnosis Date    Allergic rhinitis     Anxiety     Asthma     REMOTE    COVID-19 virus infection     2020, 2021    Hypothyroidism     Menopausal symptoms     Thyroid disease        Past Surgical History:   Procedure Laterality Date    PROCTOSIGMOIDOSCOPY N/A 7/11/2024    ANAL PROCTO SIGMOIDOSCOPY FLEXIBLE   with clip x 2 performed by Kayla Torres MD at [facility] OR    SIGMOIDOSCOPY  07/11/2024    ANAL PROCTO SIGMOIDOSCOPY FLEXIBLE   with clip x 2 (Abdomen)- Dr. Torres    SIGMOIDOSCOPY  07/11/2024    -superficial mucosal laceration-clip    TONSILLECTOMY         Family History   Problem Relation Age of Onset    Other Mother         liver disease, pneumonia    Diabetes Father     Breast Cancer Half-Sister     Other Other         1/2 sistre breast cancer. No family h/o ovarian cancer or DVT.        Chief Complaint   Patient presents with    Gynecologic Exam     Yearly exam. Last pap 10/29/2020 negative, HPV not detected. C/O constant achy feeling in lower abdomen. Periods are somewhat regular sometimes 1 week early other times 1 week late. C/O vertigo with period. Has seen PT for evaluation with some relief.           PE:  Vital Signs  Blood pressure 122/84, height 1.575 m (5' 2\"), weight 84.4 kg (186 lb), last menstrual period 11/04/2024, not currently breastfeeding.  Estimated body mass index is 34.02 kg/m² as calculated from the following:    Height as of this encounter: 1.575 m (5' 2\").    Weight as of this encounter: 84.4 kg (186 lb).    Labs:    Results for orders placed or performed in visit on 11/14/24   POCT Urinalysis Dipstick no Micro   Result Value Ref Range    Color, UA yellow     Clarity, UA clear     Glucose,